# Patient Record
Sex: FEMALE | Race: WHITE | NOT HISPANIC OR LATINO | Employment: OTHER | URBAN - METROPOLITAN AREA
[De-identification: names, ages, dates, MRNs, and addresses within clinical notes are randomized per-mention and may not be internally consistent; named-entity substitution may affect disease eponyms.]

---

## 2017-04-24 ENCOUNTER — TRANSFERRED RECORDS (OUTPATIENT)
Dept: HEALTH INFORMATION MANAGEMENT | Facility: CLINIC | Age: 72
End: 2017-04-24

## 2017-04-27 ENCOUNTER — OFFICE VISIT (OUTPATIENT)
Dept: OBGYN | Facility: CLINIC | Age: 72
End: 2017-04-27
Payer: COMMERCIAL

## 2017-04-27 ENCOUNTER — RADIANT APPOINTMENT (OUTPATIENT)
Dept: ULTRASOUND IMAGING | Facility: CLINIC | Age: 72
End: 2017-04-27
Payer: COMMERCIAL

## 2017-04-27 ENCOUNTER — TELEPHONE (OUTPATIENT)
Dept: OBGYN | Facility: CLINIC | Age: 72
End: 2017-04-27

## 2017-04-27 VITALS
BODY MASS INDEX: 29.88 KG/M2 | SYSTOLIC BLOOD PRESSURE: 140 MMHG | DIASTOLIC BLOOD PRESSURE: 92 MMHG | WEIGHT: 175 LBS | HEIGHT: 64 IN

## 2017-04-27 DIAGNOSIS — N95.0 POSTMENOPAUSAL BLEEDING: ICD-10-CM

## 2017-04-27 DIAGNOSIS — N95.0 POSTMENOPAUSAL BLEEDING: Primary | ICD-10-CM

## 2017-04-27 PROCEDURE — 99203 OFFICE O/P NEW LOW 30 MIN: CPT | Mod: 25 | Performed by: OBSTETRICS & GYNECOLOGY

## 2017-04-27 PROCEDURE — 76830 TRANSVAGINAL US NON-OB: CPT | Performed by: OBSTETRICS & GYNECOLOGY

## 2017-04-27 RX ORDER — ATORVASTATIN CALCIUM 80 MG/1
80 TABLET, FILM COATED ORAL
COMMUNITY
Start: 2017-04-21 | End: 2017-04-28

## 2017-04-27 RX ORDER — TRIAMTERENE AND HYDROCHLOROTHIAZIDE 37.5; 25 MG/1; MG/1
1 CAPSULE ORAL
COMMUNITY
Start: 2017-04-21

## 2017-04-27 RX ORDER — PHENAZOPYRIDINE HYDROCHLORIDE 200 MG/1
200 TABLET, FILM COATED ORAL ONCE
Status: CANCELLED | OUTPATIENT
Start: 2017-04-27 | End: 2017-04-27

## 2017-04-27 RX ORDER — ALLOPURINOL 100 MG/1
100 TABLET ORAL
COMMUNITY
Start: 2017-04-04

## 2017-04-27 ASSESSMENT — PATIENT HEALTH QUESTIONNAIRE - PHQ9: 5. POOR APPETITE OR OVEREATING: SEVERAL DAYS

## 2017-04-27 ASSESSMENT — ANXIETY QUESTIONNAIRES
6. BECOMING EASILY ANNOYED OR IRRITABLE: NOT AT ALL
1. FEELING NERVOUS, ANXIOUS, OR ON EDGE: MORE THAN HALF THE DAYS
3. WORRYING TOO MUCH ABOUT DIFFERENT THINGS: MORE THAN HALF THE DAYS
5. BEING SO RESTLESS THAT IT IS HARD TO SIT STILL: NOT AT ALL
GAD7 TOTAL SCORE: 6
2. NOT BEING ABLE TO STOP OR CONTROL WORRYING: SEVERAL DAYS
7. FEELING AFRAID AS IF SOMETHING AWFUL MIGHT HAPPEN: NOT AT ALL
IF YOU CHECKED OFF ANY PROBLEMS ON THIS QUESTIONNAIRE, HOW DIFFICULT HAVE THESE PROBLEMS MADE IT FOR YOU TO DO YOUR WORK, TAKE CARE OF THINGS AT HOME, OR GET ALONG WITH OTHER PEOPLE: SOMEWHAT DIFFICULT

## 2017-04-27 NOTE — MR AVS SNAPSHOT
After Visit Summary   4/27/2017    Stormy Bowman    MRN: [id number]           Patient Information     Date Of Birth          1945        Visit Information        Provider Department      4/27/2017 12:30 PM Wilman Méndez MD [facility]        Today's Diagnoses     Postmenopausal bleeding    -  1       Follow-ups after your visit        Your next 10 appointments already scheduled     Apr 27, 2017  2:00 PM CDT   US TRANSVAGINAL NON OB with WEUS2   [facility] ([facility])    [address]  [address]  [facility] [address] [phone]           Please bring a list of your medicines (including vitamins, minerals and over-the-counter drugs). Also, tell your doctor about any allergies you may have. Wear comfortable clothes and leave your valuables at home.  You do not need to do anything special to prepare for your exam.  Please call the Imaging Department at your exam site with any questions.            Apr 28, 2017   Procedure with Wilman Méndez MD   [facility] (--)    [address]  [facility] [id number] [phone]            May 01, 2017   Procedure with Wilman Kramer MD   [facility]    [address]  [facility] [id number] [phone]              Who to contact     If you have questions or need follow up information about today's clinic visit or your schedule please contact [facility] directly at [phone].  Normal or non-critical lab and imaging results will be communicated to you by MyChart, letter or phone within 4 business days after the clinic has received the results. If you do not hear from us within 7 days, please contact the clinic through MyChart or phone. If you have a critical or abnormal lab result, we will notify you by phone as soon as possible.  Submit refill requests through POKKT  "or call your pharmacy and they will forward the refill request to us. Please allow 3 business days for your refill to be completed.          Additional Information About Your Visit        MyChart Information     [employer] lets you send messages to your doctor, view your test results, renew your prescriptions, schedule appointments and more. To sign up, go to [email or web address] . Click on \"Log in\" on the left side of the screen, which will take you to the Welcome page. Then click on \"Sign up Now\" on the right side of the page.     You will be asked to enter the access code listed below, as well as some personal information. Please follow the directions to create your username and password.     Your access code is: [email or web address]  Expires: 2017  1:18 PM     Your access code will  in 90 days. If you need help or a new code, please call your [place] clinic or [phone].        Care EveryWhere ID     This is your Care EveryWhere ID. This could be used by other organizations to access your [place] medical records  [id number]        Your Vitals Were     Height Breastfeeding? BMI (Body Mass Index)             5' 3.75\" (1.619 m) No 30.27 kg/m2          Blood Pressure from Last 3 Encounters:   17 (!) 140/92   10/08/15 120/60   12 148/80    Weight from Last 3 Encounters:   17 175 lb (79.4 kg)   10/07/15 170 lb (77.1 kg)   12 170 lb (77.1 kg)               Primary Care Provider Office Phone # Fax #    Oscarjesus Munoz [phone]       [facility] [address]  [facility] [address]        Thank you!     Thank you for choosing [facility]  for your care. Our goal is always to provide you with excellent care. Hearing back from our patients is one way we can continue to improve our services. Please take a few minutes to complete the written survey that you may receive in the mail after your visit with us. Thank you!             Your Updated " Medication List - Protect others around you: Learn how to safely use, store and throw away your medicines at www.disposemymeds.org.          This list is accurate as of: 4/27/17  1:24 PM.  Always use your most recent med list.                   Brand Name Dispense Instructions for use    allopurinol 100 MG tablet    ZYLOPRIM     Take 100 mg by mouth       ASPIRIN PO      Take 162 mg by mouth daily       atorvastatin 80 MG tablet    LIPITOR     Take 80 mg by mouth       BENADRYL DYE-FREE ALLERGY PO      Take 50 mg by mouth nightly as needed.       K-DUR PO      Take 10 mEq by mouth daily       SIMVASTATIN PO      Take 80 mg by mouth.       SYNTHROID PO      Take 75 mcg by mouth.       * triamterene-hydrochlorothiazide 37.5-25 MG per tablet    MAXZIDE-25     Take 1 tablet by mouth daily.       * triamterene-hydrochlorothiazide 37.5-25 MG per capsule    DYAZIDE     Take 1 capsule by mouth       VITAMIN D3 PO      Take 2,000 Units by mouth daily       * Notice:  This list has 2 medication(s) that are the same as other medications prescribed for you. Read the directions carefully, and ask your doctor or other care provider to review them with you.

## 2017-04-27 NOTE — TELEPHONE ENCOUNTER
FROM [facility]    HSC POLYPECTOMY w/VERSAPOINT  DX PMB, Endometrial Polyp    Patient surgery scheduled on 4/28/2017 at 8:20am Check in 6:20am  Location for surgery to performed:   Surgery Outpatient  Scheduled by Sarahy 4/27/2017     Information Packet given :Yes: HANDED 4/27/2017    CPT codes given: No            Consents signed? N/A  Rep Informed :N/A    PREOP DATE :  4/27/2017  In Epic :Yes    On Spreadsheet :Yes    On Calendar EB  :Yes    In [place] Calendar STEVEN  :No    Assist NA   Assist in Epic NA  Assist Notified as needed :No     Holli Fitzgerald  Surgery Scheduler

## 2017-04-27 NOTE — PROGRESS NOTES
SUBJECTIVE:                                                   Stormy Bowman is a 71 year old female who presents to clinic today for the following health issue(s):  Patient presents with:  Menopausal Sx: bleeding started 2 days, bright red, very little. Patient states getting surgery 5/1/17      HPI: The patient is seen at this time for evaluation of a short history of postmenopausal bleeding. She had a similar episode with negative D&C in 2012.      No LMP recorded. Patient is postmenopausal..   Patient is not sexually active, No obstetric history on file..  Using menopause for contraception.    reports that she has never smoked. She does not have any smokeless tobacco history on file.    STD testing offered?  Declined    Health maintenance updated:  yes    Today's PHQ-2 Score: No flowsheet data found.  Today's PHQ-9 Score:   PHQ-9 SCORE 4/27/2017   Total Score 3     Today's HARMONY-7 Score:   HARMONY-7 SCORE 4/27/2017   Total Score 6       Problem list and histories reviewed & adjusted, as indicated.  Additional history: as documented.    Patient Active Problem List   Diagnosis     LOC (loss of consciousness) (H)     Past Surgical History:   Procedure Laterality Date     CHOLECYSTECTOMY       JOINT REPLACEMENT        Social History   Substance Use Topics     Smoking status: Never Smoker     Smokeless tobacco: Not on file     Alcohol use No           Current Outpatient Prescriptions   Medication Sig     atorvastatin (LIPITOR) 80 MG tablet Take 80 mg by mouth     allopurinol (ZYLOPRIM) 100 MG tablet Take 100 mg by mouth     triamterene-hydrochlorothiazide (DYAZIDE) 37.5-25 MG per capsule Take 1 capsule by mouth     Potassium Chloride Elli CR (K-DUR PO) Take 10 mEq by mouth daily     Cholecalciferol (VITAMIN D3 PO) Take 2,000 Units by mouth daily     ASPIRIN PO Take 162 mg by mouth daily     triamterene-hydrochlorothiazide (MAXZIDE-25) 37.5-25 MG per tablet Take 1 tablet by mouth daily.     SIMVASTATIN PO Take 80 mg  "by mouth.     Levothyroxine Sodium (SYNTHROID PO) Take 75 mcg by mouth.     DiphenhydrAMINE HCl (BENADRYL DYE-FREE ALLERGY PO) Take 50 mg by mouth nightly as needed.     No current facility-administered medications for this visit.      Allergies   Allergen Reactions     Erythromycin GI Disturbance     Penicillins        ROS:  12 point review of systems negative other than symptoms noted below.  Genitourinary: Spotting  Musculoskeletal: Joint Pain  Psychiatric: Anxiety and Difficulty Sleeping    OBJECTIVE:     BP (!) 140/92  Ht 5' 3.75\" (1.619 m)  Wt 175 lb (79.4 kg)  Breastfeeding? No  BMI 30.27 kg/m2  Body mass index is 30.27 kg/(m^2).    Exam:  Constitutional:  Appearance: Well nourished, well developed alert, in no acute distress  Neck:  Lymph Nodes:  No lymphadenopathy present; Thyroid:  Gland size normal, nontender, no nodules or masses present on palpation chest is clear to percussion and auscultation, cardiovascular exam within normal limits with normal S1 and S2 normal murmur.  Gastrointestinal:  Abdominal Examination:  Abdomen nontender to palpation, tone normal without rigidity or guarding, no masses present, umbilicus without lesions; Liver/Spleen:  No hepatomegaly present, liver nontender to palpation; Hernias:  No hernias present  Lymphatic: Lymph Nodes:  No other lymphadenopathy present  Skin:General Inspection:  No rashes present, no lesions present, no areas of discoloration; Genitalia and Groin:  No rashes present, no lesions present, no areas of discoloration, no masses present.  Neurologic/Psychiatric:  Mental Status:  Oriented X3   Pelvic Exam:  External Genitalia:     Normal appearance for age, no discharge present, no tenderness present, no inflammatory lesions present, color normal  Vagina:     Normal vaginal vault without central or paravaginal defects, ATROPHIC  Bladder:     Nontender to palpation  Urethra:   Urethral Body:  Urethra palpation normal, urethra structural support " normal   Urethral Meatus:  No erythema or lesions present  Cervix:     Appearance healthy, no lesions present, nontender to palpation, no bleeding present  Uterus:     Nontender to palpation, no masses present, position anteflexed, mobility: normal  Adnexa:     No adnexal tenderness present, no adnexal masses present  Perineum:     Perineum within normal limits, no evidence of trauma, no rashes or skin lesions present  Inguinal Lymph Nodes:     No lymphadenopathy present       In-Clinic Test Results:      ASSESSMENT/PLAN:                                                      71-year-old patient with postmenopausal bleeding and intrauterine filling defect. This is most likely a solitary polyp. We recommend a hysteroscopy with polypectomy and we'll expedite this as she has orthopedic surgery next week.            Wilman Méndez MD  [facility] [place]

## 2017-04-28 ENCOUNTER — ANESTHESIA EVENT (OUTPATIENT)
Dept: SURGERY | Facility: CLINIC | Age: 72
End: 2017-04-28
Payer: COMMERCIAL

## 2017-04-28 ENCOUNTER — ANESTHESIA (OUTPATIENT)
Dept: SURGERY | Facility: CLINIC | Age: 72
End: 2017-04-28
Payer: COMMERCIAL

## 2017-04-28 ENCOUNTER — HOSPITAL ENCOUNTER (OUTPATIENT)
Facility: CLINIC | Age: 72
Discharge: HOME | End: 2017-04-28
Attending: OBSTETRICS & GYNECOLOGY | Admitting: OBSTETRICS & GYNECOLOGY
Payer: COMMERCIAL

## 2017-04-28 VITALS
TEMPERATURE: 95.5 F | DIASTOLIC BLOOD PRESSURE: 96 MMHG | BODY MASS INDEX: 30.88 KG/M2 | SYSTOLIC BLOOD PRESSURE: 123 MMHG | OXYGEN SATURATION: 90 % | WEIGHT: 174.3 LBS | RESPIRATION RATE: 12 BRPM | HEIGHT: 63 IN

## 2017-04-28 DIAGNOSIS — N95.0 POST-MENOPAUSAL BLEEDING: Primary | ICD-10-CM

## 2017-04-28 PROCEDURE — 86923 COMPATIBILITY TEST ELECTRIC: CPT | Performed by: ORTHOPAEDIC SURGERY

## 2017-04-28 PROCEDURE — 36415 COLL VENOUS BLD VENIPUNCTURE: CPT | Performed by: ORTHOPAEDIC SURGERY

## 2017-04-28 PROCEDURE — 37000008 ZZH ANESTHESIA TECHNICAL FEE, 1ST 30 MIN: Performed by: OBSTETRICS & GYNECOLOGY

## 2017-04-28 PROCEDURE — 25000128 H RX IP 250 OP 636: Performed by: NURSE ANESTHETIST, CERTIFIED REGISTERED

## 2017-04-28 PROCEDURE — 88305 TISSUE EXAM BY PATHOLOGIST: CPT | Mod: 26 | Performed by: OBSTETRICS & GYNECOLOGY

## 2017-04-28 PROCEDURE — 25000128 H RX IP 250 OP 636: Performed by: ANESTHESIOLOGY

## 2017-04-28 PROCEDURE — 71000013 ZZH RECOVERY PHASE 1 LEVEL 1 EA ADDTL HR: Performed by: OBSTETRICS & GYNECOLOGY

## 2017-04-28 PROCEDURE — 71000012 ZZH RECOVERY PHASE 1 LEVEL 1 FIRST HR: Performed by: OBSTETRICS & GYNECOLOGY

## 2017-04-28 PROCEDURE — 40000170 ZZH STATISTIC PRE-PROCEDURE ASSESSMENT II: Performed by: OBSTETRICS & GYNECOLOGY

## 2017-04-28 PROCEDURE — 25800025 ZZH RX 258: Performed by: NURSE ANESTHETIST, CERTIFIED REGISTERED

## 2017-04-28 PROCEDURE — 25000125 ZZHC RX 250: Performed by: ANESTHESIOLOGY

## 2017-04-28 PROCEDURE — 58563 HYSTEROSCOPY ABLATION: CPT | Performed by: OBSTETRICS & GYNECOLOGY

## 2017-04-28 PROCEDURE — 36000056 ZZH SURGERY LEVEL 3 1ST 30 MIN: Performed by: OBSTETRICS & GYNECOLOGY

## 2017-04-28 PROCEDURE — 27210995 ZZH RX 272: Performed by: OBSTETRICS & GYNECOLOGY

## 2017-04-28 PROCEDURE — 86900 BLOOD TYPING SEROLOGIC ABO: CPT | Performed by: ORTHOPAEDIC SURGERY

## 2017-04-28 PROCEDURE — 86901 BLOOD TYPING SEROLOGIC RH(D): CPT | Performed by: ORTHOPAEDIC SURGERY

## 2017-04-28 PROCEDURE — 71000027 ZZH RECOVERY PHASE 2 EACH 15 MINS: Performed by: OBSTETRICS & GYNECOLOGY

## 2017-04-28 PROCEDURE — 88305 TISSUE EXAM BY PATHOLOGIST: CPT | Performed by: OBSTETRICS & GYNECOLOGY

## 2017-04-28 PROCEDURE — 27210794 ZZH OR GENERAL SUPPLY STERILE: Performed by: OBSTETRICS & GYNECOLOGY

## 2017-04-28 PROCEDURE — 37000009 ZZH ANESTHESIA TECHNICAL FEE, EACH ADDTL 15 MIN: Performed by: OBSTETRICS & GYNECOLOGY

## 2017-04-28 PROCEDURE — 25800025 ZZH RX 258: Performed by: OBSTETRICS & GYNECOLOGY

## 2017-04-28 PROCEDURE — 86850 RBC ANTIBODY SCREEN: CPT | Performed by: ORTHOPAEDIC SURGERY

## 2017-04-28 PROCEDURE — 36000058 ZZH SURGERY LEVEL 3 EA 15 ADDTL MIN: Performed by: OBSTETRICS & GYNECOLOGY

## 2017-04-28 PROCEDURE — 25000125 ZZHC RX 250: Performed by: NURSE ANESTHETIST, CERTIFIED REGISTERED

## 2017-04-28 RX ORDER — ONDANSETRON 2 MG/ML
4 INJECTION INTRAMUSCULAR; INTRAVENOUS EVERY 30 MIN PRN
Status: DISCONTINUED | OUTPATIENT
Start: 2017-04-28 | End: 2017-04-28 | Stop reason: HOSPADM

## 2017-04-28 RX ORDER — FENTANYL CITRATE 50 UG/ML
25-50 INJECTION, SOLUTION INTRAMUSCULAR; INTRAVENOUS EVERY 5 MIN PRN
Status: DISCONTINUED | OUTPATIENT
Start: 2017-04-28 | End: 2017-04-28 | Stop reason: HOSPADM

## 2017-04-28 RX ORDER — ONDANSETRON 2 MG/ML
INJECTION INTRAMUSCULAR; INTRAVENOUS PRN
Status: DISCONTINUED | OUTPATIENT
Start: 2017-04-28 | End: 2017-04-28

## 2017-04-28 RX ORDER — PROPOFOL 10 MG/ML
INJECTION, EMULSION INTRAVENOUS CONTINUOUS PRN
Status: DISCONTINUED | OUTPATIENT
Start: 2017-04-28 | End: 2017-04-28

## 2017-04-28 RX ORDER — KETOROLAC TROMETHAMINE 15 MG/ML
15 INJECTION, SOLUTION INTRAMUSCULAR; INTRAVENOUS ONCE
Status: COMPLETED | OUTPATIENT
Start: 2017-04-28 | End: 2017-04-28

## 2017-04-28 RX ORDER — ONDANSETRON 4 MG/1
4 TABLET, ORALLY DISINTEGRATING ORAL EVERY 30 MIN PRN
Status: DISCONTINUED | OUTPATIENT
Start: 2017-04-28 | End: 2017-04-28 | Stop reason: HOSPADM

## 2017-04-28 RX ORDER — FENTANYL CITRATE 50 UG/ML
25-50 INJECTION, SOLUTION INTRAMUSCULAR; INTRAVENOUS
Status: CANCELLED | OUTPATIENT
Start: 2017-04-28

## 2017-04-28 RX ORDER — ATORVASTATIN CALCIUM 80 MG/1
80 TABLET, FILM COATED ORAL AT BEDTIME
COMMUNITY
End: 2020-03-02

## 2017-04-28 RX ORDER — MAGNESIUM HYDROXIDE 1200 MG/15ML
LIQUID ORAL PRN
Status: DISCONTINUED | OUTPATIENT
Start: 2017-04-28 | End: 2017-04-28 | Stop reason: HOSPADM

## 2017-04-28 RX ORDER — LIDOCAINE HYDROCHLORIDE 20 MG/ML
INJECTION, SOLUTION INFILTRATION; PERINEURAL PRN
Status: DISCONTINUED | OUTPATIENT
Start: 2017-04-28 | End: 2017-04-28

## 2017-04-28 RX ORDER — FENTANYL CITRATE 50 UG/ML
INJECTION, SOLUTION INTRAMUSCULAR; INTRAVENOUS PRN
Status: DISCONTINUED | OUTPATIENT
Start: 2017-04-28 | End: 2017-04-28

## 2017-04-28 RX ORDER — PHYSOSTIGMINE SALICYLATE 1 MG/ML
1.2 INJECTION INTRAVENOUS
Status: DISCONTINUED | OUTPATIENT
Start: 2017-04-28 | End: 2017-04-28 | Stop reason: HOSPADM

## 2017-04-28 RX ORDER — MEPERIDINE HYDROCHLORIDE 25 MG/ML
12.5 INJECTION INTRAMUSCULAR; INTRAVENOUS; SUBCUTANEOUS
Status: DISCONTINUED | OUTPATIENT
Start: 2017-04-28 | End: 2017-04-28 | Stop reason: HOSPADM

## 2017-04-28 RX ORDER — HYDROCODONE BITARTRATE AND ACETAMINOPHEN 5; 325 MG/1; MG/1
1-2 TABLET ORAL EVERY 4 HOURS PRN
Qty: 10 TABLET | Refills: 0 | Status: ON HOLD | OUTPATIENT
Start: 2017-04-28 | End: 2017-05-01

## 2017-04-28 RX ORDER — SODIUM CHLORIDE, SODIUM LACTATE, POTASSIUM CHLORIDE, CALCIUM CHLORIDE 600; 310; 30; 20 MG/100ML; MG/100ML; MG/100ML; MG/100ML
INJECTION, SOLUTION INTRAVENOUS CONTINUOUS
Status: DISCONTINUED | OUTPATIENT
Start: 2017-04-28 | End: 2017-04-28 | Stop reason: HOSPADM

## 2017-04-28 RX ORDER — NALOXONE HYDROCHLORIDE 0.4 MG/ML
.1-.4 INJECTION, SOLUTION INTRAMUSCULAR; INTRAVENOUS; SUBCUTANEOUS
Status: DISCONTINUED | OUTPATIENT
Start: 2017-04-28 | End: 2017-04-28 | Stop reason: HOSPADM

## 2017-04-28 RX ORDER — HYDROMORPHONE HYDROCHLORIDE 1 MG/ML
.3-.5 INJECTION, SOLUTION INTRAMUSCULAR; INTRAVENOUS; SUBCUTANEOUS EVERY 10 MIN PRN
Status: CANCELLED | OUTPATIENT
Start: 2017-04-28

## 2017-04-28 RX ORDER — SODIUM CHLORIDE, SODIUM LACTATE, POTASSIUM CHLORIDE, CALCIUM CHLORIDE 600; 310; 30; 20 MG/100ML; MG/100ML; MG/100ML; MG/100ML
INJECTION, SOLUTION INTRAVENOUS CONTINUOUS PRN
Status: DISCONTINUED | OUTPATIENT
Start: 2017-04-28 | End: 2017-04-28

## 2017-04-28 RX ORDER — PROPOFOL 10 MG/ML
INJECTION, EMULSION INTRAVENOUS PRN
Status: DISCONTINUED | OUTPATIENT
Start: 2017-04-28 | End: 2017-04-28

## 2017-04-28 RX ORDER — DEXAMETHASONE SODIUM PHOSPHATE 4 MG/ML
INJECTION, SOLUTION INTRA-ARTICULAR; INTRALESIONAL; INTRAMUSCULAR; INTRAVENOUS; SOFT TISSUE PRN
Status: DISCONTINUED | OUTPATIENT
Start: 2017-04-28 | End: 2017-04-28

## 2017-04-28 RX ADMIN — FENTANYL CITRATE 50 MCG: 50 INJECTION INTRAMUSCULAR; INTRAVENOUS at 09:37

## 2017-04-28 RX ADMIN — ONDANSETRON 4 MG: 2 INJECTION INTRAMUSCULAR; INTRAVENOUS at 09:00

## 2017-04-28 RX ADMIN — PROPOFOL 160 MG: 10 INJECTION, EMULSION INTRAVENOUS at 08:57

## 2017-04-28 RX ADMIN — MIDAZOLAM HYDROCHLORIDE 1 MG: 1 INJECTION, SOLUTION INTRAMUSCULAR; INTRAVENOUS at 08:57

## 2017-04-28 RX ADMIN — DEXAMETHASONE SODIUM PHOSPHATE 4 MG: 4 INJECTION, SOLUTION INTRA-ARTICULAR; INTRALESIONAL; INTRAMUSCULAR; INTRAVENOUS; SOFT TISSUE at 09:00

## 2017-04-28 RX ADMIN — PROPOFOL 150 MCG/KG/MIN: 10 INJECTION, EMULSION INTRAVENOUS at 08:57

## 2017-04-28 RX ADMIN — FENTANYL CITRATE 50 MCG: 50 INJECTION INTRAMUSCULAR; INTRAVENOUS at 09:58

## 2017-04-28 RX ADMIN — FENTANYL CITRATE 50 MCG: 50 INJECTION INTRAMUSCULAR; INTRAVENOUS at 09:49

## 2017-04-28 RX ADMIN — SODIUM CHLORIDE, POTASSIUM CHLORIDE, SODIUM LACTATE AND CALCIUM CHLORIDE: 600; 310; 30; 20 INJECTION, SOLUTION INTRAVENOUS at 08:53

## 2017-04-28 RX ADMIN — KETOROLAC TROMETHAMINE 15 MG: 15 INJECTION, SOLUTION INTRAMUSCULAR; INTRAVENOUS at 10:01

## 2017-04-28 RX ADMIN — FENTANYL CITRATE 50 MCG: 50 INJECTION, SOLUTION INTRAMUSCULAR; INTRAVENOUS at 08:57

## 2017-04-28 RX ADMIN — LIDOCAINE HYDROCHLORIDE 60 MG: 20 INJECTION, SOLUTION INFILTRATION; PERINEURAL at 08:57

## 2017-04-28 ASSESSMENT — ANXIETY QUESTIONNAIRES: GAD7 TOTAL SCORE: 6

## 2017-04-28 ASSESSMENT — PATIENT HEALTH QUESTIONNAIRE - PHQ9: SUM OF ALL RESPONSES TO PHQ QUESTIONS 1-9: 3

## 2017-04-28 NOTE — ANESTHESIA PREPROCEDURE EVALUATION
Anesthesia Evaluation     . Pt has had prior anesthetic.     No history of anesthetic complications          ROS/MED HX    ENT/Pulmonary:      (-) cystic fibrosis   Neurologic:       Cardiovascular:     (+) Dyslipidemia, hypertension----. : . . . :. .       METS/Exercise Tolerance:     Hematologic:         Musculoskeletal:   (+) arthritis (gout), , , -       GI/Hepatic:         Renal/Genitourinary:         Endo:         Psychiatric:         Infectious Disease:         Malignancy:         Other: Comment: Post menopausal bleeding                    Physical Exam  Normal systems: cardiovascular and pulmonary    Airway   Mallampati: II  TM distance: >3 FB  Neck ROM: limited    Dental     Cardiovascular       Pulmonary                     Anesthesia Plan      History & Physical Review  History and physical reviewed and following examination; no interval change.    ASA Status:  2 .    NPO Status:  > 8 hours    Plan for General and LMA with Intravenous and Propofol induction. Maintenance will be TIVA.    PONV prophylaxis:  Ondansetron (or other 5HT-3) and Dexamethasone or Solumedrol       Postoperative Care  Postoperative pain management:  IV analgesics and Oral pain medications.      Consents  Anesthetic plan, risks, benefits and alternatives discussed with:  Patient..                          .  DPreop diagnosis: POST MENOPAUSAL BLEEDING, ENDOMETRIAL POLYPS  Procedure(s):  COMBINED HYSTEROSCOPY, ABLATE ENDOMETRIUM VERSAPOINT  Allergies   Allergen Reactions     Erythromycin GI Disturbance     Penicillins Unknown     Dilaudid [Hydromorphone] Nausea and Vomiting     Can take Norco and Percocet       No current facility-administered medications on file prior to encounter.   Current Outpatient Prescriptions on File Prior to Encounter:  ATORVASTATIN CALCIUM PO Take 80 mg by mouth daily   Probiotic Product (ALIGN PO) Take 4 mg by mouth daily   Psyllium (WAL-MUCIL PO) Take 1 capsule by mouth 2 times daily   allopurinol  (ZYLOPRIM) 100 MG tablet Take 100 mg by mouth daily    triamterene-hydrochlorothiazide (DYAZIDE) 37.5-25 MG per capsule Take 1 capsule by mouth daily    Potassium Chloride Elli CR (K-DUR PO) Take 10 mEq by mouth daily   Cholecalciferol (VITAMIN D3 PO) Take 2,000 Units by mouth every evening    Levothyroxine Sodium (SYNTHROID PO) Take 75 mcg by mouth daily    DiphenhydrAMINE HCl (BENADRYL DYE-FREE ALLERGY PO) Take 50 mg by mouth every evening (Takes x 25mg tablet = 50mg dose)   ASPIRIN PO Take 162 mg by mouth every evening (Takes 2 x 81mg tablet = 162mg)

## 2017-04-28 NOTE — IP AVS SNAPSHOT
MRN:[id number]                      After Visit Summary   4/28/2017    Stormy Bowman    MRN: [id number]           Thank you!     Thank you for choosing [facility] for your care. Our goal is always to provide you with excellent care. Hearing back from our patients is one way we can continue to improve our services. Please take a few minutes to complete the written survey that you may receive in the mail after you visit with us. Thank you!        Patient Information     Date Of Birth          1945        About your hospital stay     You were admitted on:  April 28, 2017 You last received care in the:  [facility]    You were discharged on:  April 28, 2017       Who to Call     For medical emergencies, please call 911.  For non-urgent questions about your medical care, please call your primary care provider or clinic, [phone]  For questions related to your surgery, please call your surgery clinic        Attending Provider     Provider Wilman Rayo MD OB/Gyn       Primary Care Provider Office Phone # Fax #    Oscar Ivana Munoz [phone]       [facility] [address]  [facility] [address]        After Care Instructions     Discharge Instructions       Patient to arrange follow up appointment by phone in 2 weeks                  Your next 10 appointments already scheduled     May 01, 2017   Procedure with Wilman Kramer MD   [facility] (--)    [address]  [facility] [address] [phone]              Further instructions from your care team           Same Day Surgery Discharge Instructions for  Sedation and General Anesthesia       It's not unusual to feel dizzy, light-headed or faint for up to 24 hours after surgery or while taking pain medication.  If you have these symptoms: sit for a few minutes before standing and have someone assist you when you get up to walk or use the  bathroom.      You should rest and relax for the next 24 hours. We recommend you make arrangements to have an adult stay with you for at least 24 hours after your discharge.  Avoid hazardous and strenuous activity.      DO NOT DRIVE any vehicle or operate mechanical equipment for 24 hours following the end of your surgery.  Even though you may feel normal, your reactions may be affected by the medication you have received.      Do not drink alcoholic beverages for 24 hours following surgery.       Slowly progress to your regular diet as you feel able. It's not unusual to feel nauseated and/or vomit after receiving anesthesia.  If you develop these symptoms, drink clear liquids (apple juice, ginger ale, broth, 7-up, etc. ) until you feel better.  If your nausea and vomiting persists for 24 hours, please notify your surgeon.        All narcotic pain medications, along with inactivity and anesthesia, can cause constipation. Drinking plenty of liquids and increasing fiber intake will help.      For any questions of a medical nature, call your surgeon.      Do not make important decisions for 24 hours.      If you had general anesthesia, you may have a sore throat for a couple of days related to the breathing tube used during surgery.  You may use Cepacol lozenges to help with this discomfort.  If it worsens or if you develop a fever, contact your surgeon.       If you feel your pain is not well managed with the pain medications prescribed by your surgeon, please contact your surgeon's office to let them know so they can address your concerns.         Post-Operative Instructions Following Endometrial Ablation    After endometrial ablation, some women experience some cramping, mild pain, nausea and/or vomiting.  Most women feel back to themselves and can resume normal activities within a day or two.      DRAINAGE:  Watery and/or bloody discharge following endometrial ablation is normal.  Reddish, yellow or brown discharge  "is normal.  Light spotting may last up to a few weeks.  Discharge may increase with certain activities.      Do not use tampons, douches or resume intercourse until bleeding has stopped and as directed by your surgeon.    Reasons to call your Surgeon:    1.  A fever higher than 100.4   2.  Worsening pelvic pain that is not relieved prescribed pain medication  3.  Nausea, vomiting or shortness of breath  4.  A greenish vaginal discharge.      **If you have questions or concerns about your procedure,   call Dr. Méndez at [phone]**      While you were at the hospital today you received Toradol, an antiinflammatory medication similar to Ibuprofen.  You should not take other antiinflammatory medication, such as Ibuprofen, Motrin, Advil, Aleve, Naprosyn, etc, until 4:00 pm.     Pending Results     Date and Time Order Name Status Description    2017 0912 Surgical pathology exam In process             Admission Information     Date & Time Provider Department Dept. Phone    2017 Wilman Méndez MD [facility] Same Day Surgery [phone]      Your Vitals Were     Blood Pressure Temperature Respirations Height Weight Pulse Oximetry    123/96 95.5  F (35.3  C) 12 1.588 m (5' 2.5\") 79.1 kg (174 lb 4.8 oz) 90%    BMI (Body Mass Index)                   31.37 kg/m2           [employer]     [employer] lets you send messages to your doctor, view your test results, renew your prescriptions, schedule appointments and more. To sign up, go to [email or web address] . Click on \"Log in\" on the left side of the screen, which will take you to the Welcome page. Then click on \"Sign up Now\" on the right side of the page.     You will be asked to enter the access code listed below, as well as some personal information. Please follow the directions to create your username and password.     Your access code is: [email or web address]  Expires: 2017  1:18 PM     Your access code will  in 90 days. If you need help " or a new code, please call your [facility] or [phone].        Care EveryWhere ID     This is your Care EveryWhere ID. This could be used by other organizations to access your [place] medical records  [id number]           Review of your medicines      START taking        Dose / Directions    HYDROcodone-acetaminophen 5-325 MG per tablet   Commonly known as:  NORCO   Used for:  Post-menopausal bleeding        Dose:  1-2 tablet   Take 1-2 tablets by mouth every 4 hours as needed for other (Moderate to Severe Pain)   Quantity:  10 tablet   Refills:  0         CONTINUE these medicines which have NOT CHANGED        Dose / Directions    ALIGN PO        Dose:  4 mg   Take 4 mg by mouth daily   Refills:  0       allopurinol 100 MG tablet   Commonly known as:  ZYLOPRIM        Dose:  100 mg   Take 100 mg by mouth daily   Refills:  0       ASPIRIN PO   Notes to Patient:  Do Not resume until after your Hip surgery.        Dose:  162 mg   Take 162 mg by mouth every evening (Takes 2 x 81mg tablet = 162mg)   Refills:  0       ATORVASTATIN CALCIUM PO        Dose:  80 mg   Take 80 mg by mouth daily   Refills:  0       BENADRYL DYE-FREE ALLERGY PO        Dose:  50 mg   Take 50 mg by mouth every evening (Takes x 25mg tablet = 50mg dose)   Refills:  0       K-DUR PO        Dose:  10 mEq   Take 10 mEq by mouth daily   Refills:  0       SYNTHROID PO        Dose:  75 mcg   Take 75 mcg by mouth daily   Refills:  0       triamterene-hydrochlorothiazide 37.5-25 MG per capsule   Commonly known as:  DYAZIDE        Dose:  1 capsule   Take 1 capsule by mouth daily   Refills:  0       VITAMIN D3 PO        Dose:  2000 Units   Take 2,000 Units by mouth every evening   Refills:  0       WAL-MUCIL PO        Dose:  1 capsule   Take 1 capsule by mouth 2 times daily   Refills:  0            Where to get your medicines      Some of these will need a paper prescription and others can be bought over the counter. Ask your nurse if you have  questions.     Bring a paper prescription for each of these medications     HYDROcodone-acetaminophen 5-325 MG per tablet                Protect others around you: Learn how to safely use, store and throw away your medicines at www.disposemymeds.org.             Medication List: This is a list of all your medications and when to take them. Check marks below indicate your daily home schedule. Keep this list as a reference.      Medications           Morning Afternoon Evening Bedtime As Needed    ALIGN PO   Take 4 mg by mouth daily                                allopurinol 100 MG tablet   Commonly known as:  ZYLOPRIM   Take 100 mg by mouth daily                                ASPIRIN PO   Take 162 mg by mouth every evening (Takes 2 x 81mg tablet = 162mg)   Notes to Patient:  Do Not resume until after your Hip surgery.                                ATORVASTATIN CALCIUM PO   Take 80 mg by mouth daily                                BENADRYL DYE-FREE ALLERGY PO   Take 50 mg by mouth every evening (Takes x 25mg tablet = 50mg dose)                                HYDROcodone-acetaminophen 5-325 MG per tablet   Commonly known as:  NORCO   Take 1-2 tablets by mouth every 4 hours as needed for other (Moderate to Severe Pain)                                K-DUR PO   Take 10 mEq by mouth daily                                SYNTHROID PO   Take 75 mcg by mouth daily                                triamterene-hydrochlorothiazide 37.5-25 MG per capsule   Commonly known as:  DYAZIDE   Take 1 capsule by mouth daily                                VITAMIN D3 PO   Take 2,000 Units by mouth every evening                                WAL-MUCIL PO   Take 1 capsule by mouth 2 times daily

## 2017-04-28 NOTE — OP NOTE
DATE OF SURGERY:  2017.      REASON FOR ADMISSION:  Postmenopausal bleeding.      OPERATIVE PROCEDURES:  Examination under anesthesia, cervical dilatation for complete cervical stenosis, hysteroscopy, removal of endometrial polyp, endometrial ablation with Versapoint.      DESCRIPTION OF PROCEDURE:  After general anesthesia, Stormy Bowman was placed in the dorsal lithotomy position and prepped and draped in the usual fashion.  The cervix was grasped and delivered to the introitus.  There was complete cervical stenosis and a lacrimal duct probe was used to carefully dilate past the scarring.  Progressive dilators were used to be able to place the hysteroscope.  The hysteroscope was placed and we could find a large solitary polyp emanating from the left cornual portion of the endometrial cavity.  The grasping forceps was used to cross the base and this was removed and sent to the pathologist intact.  The Versapoint was brought in and an ablation of the entire endometrial cavity from the ostia all the way to the lower uterine segment was accomplished to try to prevent any further hyperplasia, hypertrophy or polyps.  The patient tolerated this very well.  Blood loss for the case was 1 mL.  Fluid deficit was 0.      PLAN:  We will await pathology for final disposition.         NINOSKA VALENZUELA JR, MD             D: 2017 09:29   T: 2017 13:09   MT: TS      Name:     STORMY BOWMAN   MRN:      [id number]        Account:        [id number]   :      1945           Procedure Date: 2017      Document: [id number]       cc: [facility]        MD Oscar Escobar Jr, MD

## 2017-04-28 NOTE — BRIEF OP NOTE
[facility] Brief Operative Note    Pre-operative diagnosis: POST MENOPAUSAL BLEEDING, ENDOMETRIAL POLYP   Post-operative diagnosis COMPLETE CERVICAL STENOSIS   Procedure: Procedure(s):CERVICAL DILATATION FOR COMPLETE CERVICAL STENOSIS  HYSTEROSCOPY, POLYPECTOMY, ENDOMETRIAL ABLATION WITH VERSAPOINT - Wound Class: II-Clean Contaminated   Surgeon(s): Surgeon(s) and Role:      Wilman Méndez MD - Primary   Estimated blood loss: 1 CC    Specimens:   ID Type Source Tests Collected by Time Destination   A : endometrial polyp Polyp Endometrium SURGICAL PATHOLOGY EXAM Wilman Méndez MD 4/28/2017  9:11 AM       Findings: LARGE SOLITARY POLYP

## 2017-04-28 NOTE — ANESTHESIA CARE TRANSFER NOTE
Patient: Stormy Bowman    Procedure(s):  HYSTEROSCOPY, POLYPECTOMY WITH VERSAPOINT - Wound Class: II-Clean Contaminated    Diagnosis: POST MENOPAUSAL BLEEDING, ENDOMETRIAL POLYPS  Diagnosis Additional Information: No value filed.    Anesthesia Type:   General, LMA     Note:  Airway :Face Mask  Patient transferred to:Phase II  Comments: Spontaneous resp observed. Monitors and alarms on. VSS. Report given.      Vitals: (Last set prior to Anesthesia Care Transfer)    CRNA VITALS  4/28/2017 0853 - 4/28/2017 0928      4/28/2017             NIBP: (!)  137/91    NIBP Mean: 120                Electronically Signed By: DOLORES Villalta CRNA  April 28, 2017  9:28 AM

## 2017-04-28 NOTE — DISCHARGE INSTRUCTIONS
Same Day Surgery Discharge Instructions for  Sedation and General Anesthesia       It's not unusual to feel dizzy, light-headed or faint for up to 24 hours after surgery or while taking pain medication.  If you have these symptoms: sit for a few minutes before standing and have someone assist you when you get up to walk or use the bathroom.      You should rest and relax for the next 24 hours. We recommend you make arrangements to have an adult stay with you for at least 24 hours after your discharge.  Avoid hazardous and strenuous activity.      DO NOT DRIVE any vehicle or operate mechanical equipment for 24 hours following the end of your surgery.  Even though you may feel normal, your reactions may be affected by the medication you have received.      Do not drink alcoholic beverages for 24 hours following surgery.       Slowly progress to your regular diet as you feel able. It's not unusual to feel nauseated and/or vomit after receiving anesthesia.  If you develop these symptoms, drink clear liquids (apple juice, ginger ale, broth, 7-up, etc. ) until you feel better.  If your nausea and vomiting persists for 24 hours, please notify your surgeon.        All narcotic pain medications, along with inactivity and anesthesia, can cause constipation. Drinking plenty of liquids and increasing fiber intake will help.      For any questions of a medical nature, call your surgeon.      Do not make important decisions for 24 hours.      If you had general anesthesia, you may have a sore throat for a couple of days related to the breathing tube used during surgery.  You may use Cepacol lozenges to help with this discomfort.  If it worsens or if you develop a fever, contact your surgeon.       If you feel your pain is not well managed with the pain medications prescribed by your surgeon, please contact your surgeon's office to let them know so they can address your concerns.         Post-Operative Instructions Following  Endometrial Ablation    After endometrial ablation, some women experience some cramping, mild pain, nausea and/or vomiting.  Most women feel back to themselves and can resume normal activities within a day or two.      DRAINAGE:  Watery and/or bloody discharge following endometrial ablation is normal.  Reddish, yellow or brown discharge is normal.  Light spotting may last up to a few weeks.  Discharge may increase with certain activities.      Do not use tampons, douches or resume intercourse until bleeding has stopped and as directed by your surgeon.    Reasons to call your Surgeon:    1.  A fever higher than 100.4   2.  Worsening pelvic pain that is not relieved prescribed pain medication  3.  Nausea, vomiting or shortness of breath  4.  A greenish vaginal discharge.      **If you have questions or concerns about your procedure,   call Dr. Méndez at [phone]**      While you were at the hospital today you received Toradol, an antiinflammatory medication similar to Ibuprofen.  You should not take other antiinflammatory medication, such as Ibuprofen, Motrin, Advil, Aleve, Naprosyn, etc, until 4:00 pm.

## 2017-04-28 NOTE — ANESTHESIA POSTPROCEDURE EVALUATION
Patient: Stormy Bowman    Procedure(s):  CERVICAL DILITATION FOR COMPLETE CERVICAL STENOSIS, HYSTEROSCOPY, POLYPECTOMY, ENDOMETRIAL ABLATION WITH VERSAPOINT  - Wound Class: II-Clean Contaminated    Diagnosis:POST MENOPAUSAL BLEEDING, ENDOMETRIAL POLYPS  Diagnosis Additional Information: No value filed.    Anesthesia Type:  General, LMA    Note:  Anesthesia Post Evaluation    Patient location during evaluation: PACU  Patient participation: Able to fully participate in evaluation  Level of consciousness: awake  Pain management: adequate  Airway patency: patent  Cardiovascular status: acceptable  Respiratory status: acceptable  Hydration status: acceptable  PONV: none     Anesthetic complications: None          Last vitals:  Vitals:    04/28/17 1000 04/28/17 1015 04/28/17 1030   BP: 128/85 127/82 (!) 123/96   Resp: 12 16 12   Temp: 35.3  C (95.5  F)     SpO2: 99% 98% 90%         Electronically Signed By: Hosea Higginbotham MD  April 28, 2017  10:50 AM

## 2017-04-28 NOTE — IP AVS SNAPSHOT
[facility] Same Day Surgery    [address]    [place] [address]    Phone:  [phone]    Fax:  [phone]                                       After Visit Summary   4/28/2017    Stormy Bowman    MRN: [id number]           After Visit Summary Signature Page     I have received my discharge instructions, and my questions have been answered. I have discussed any challenges I see with this plan with the nurse or doctor.    ..........................................................................................................................................  Patient/Patient Representative Signature      ..........................................................................................................................................  Patient Representative Print Name and Relationship to Patient    ..................................................               ................................................  Date                                            Time    ..........................................................................................................................................  Reviewed by Signature/Title    ...................................................              ..............................................  Date                                                            Time

## 2017-04-30 LAB
ABO + RH BLD: NORMAL
ABO + RH BLD: NORMAL
BLD GP AB SCN SERPL QL: NORMAL
BLD PROD TYP BPU: NORMAL
BLOOD BANK CMNT PATIENT-IMP: NORMAL
NUM BPU REQUESTED: 4
SPECIMEN EXP DATE BLD: NORMAL

## 2017-05-01 ENCOUNTER — HOSPITAL ENCOUNTER (INPATIENT)
Facility: CLINIC | Age: 72
LOS: 3 days | Discharge: NURSING, REHAB OR LONG-TERM CARE | DRG: 467 | End: 2017-05-04
Attending: ORTHOPAEDIC SURGERY | Admitting: ORTHOPAEDIC SURGERY
Payer: MEDICARE

## 2017-05-01 ENCOUNTER — APPOINTMENT (OUTPATIENT)
Dept: PHYSICAL THERAPY | Facility: CLINIC | Age: 72
DRG: 467 | End: 2017-05-01
Attending: ORTHOPAEDIC SURGERY
Payer: MEDICARE

## 2017-05-01 ENCOUNTER — SURGERY (OUTPATIENT)
Age: 72
End: 2017-05-01

## 2017-05-01 ENCOUNTER — ANESTHESIA (OUTPATIENT)
Dept: SURGERY | Facility: CLINIC | Age: 72
DRG: 467 | End: 2017-05-01
Payer: MEDICARE

## 2017-05-01 ENCOUNTER — APPOINTMENT (OUTPATIENT)
Dept: GENERAL RADIOLOGY | Facility: CLINIC | Age: 72
DRG: 467 | End: 2017-05-01
Attending: ORTHOPAEDIC SURGERY
Payer: MEDICARE

## 2017-05-01 ENCOUNTER — ANESTHESIA EVENT (OUTPATIENT)
Dept: SURGERY | Facility: CLINIC | Age: 72
DRG: 467 | End: 2017-05-01
Payer: MEDICARE

## 2017-05-01 DIAGNOSIS — Z96.649 HIP JOINT REPLACEMENT STATUS: Primary | ICD-10-CM

## 2017-05-01 LAB
ABO + RH BLD: NORMAL
ABO + RH BLD: NORMAL
BLD GP AB SCN SERPL QL: NORMAL
BLOOD BANK CMNT PATIENT-IMP: NORMAL
COPATH REPORT: NORMAL
CREAT SERPL-MCNC: 1.05 MG/DL (ref 0.52–1.04)
GFR SERPL CREATININE-BSD FRML MDRD: 51 ML/MIN/1.7M2
PLATELET # BLD AUTO: 180 10E9/L (ref 150–450)
POTASSIUM SERPL-SCNC: 3.7 MMOL/L (ref 3.4–5.3)
SPECIMEN EXP DATE BLD: NORMAL

## 2017-05-01 PROCEDURE — 86900 BLOOD TYPING SEROLOGIC ABO: CPT | Performed by: ANESTHESIOLOGY

## 2017-05-01 PROCEDURE — 25000132 ZZH RX MED GY IP 250 OP 250 PS 637: Mod: GY | Performed by: ORTHOPAEDIC SURGERY

## 2017-05-01 PROCEDURE — 86901 BLOOD TYPING SEROLOGIC RH(D): CPT | Performed by: ANESTHESIOLOGY

## 2017-05-01 PROCEDURE — 0SUA09Z SUPPLEMENT RIGHT HIP JOINT, ACETABULAR SURFACE WITH LINER, OPEN APPROACH: ICD-10-PCS | Performed by: ORTHOPAEDIC SURGERY

## 2017-05-01 PROCEDURE — 37000009 ZZH ANESTHESIA TECHNICAL FEE, EACH ADDTL 15 MIN: Performed by: ORTHOPAEDIC SURGERY

## 2017-05-01 PROCEDURE — 97530 THERAPEUTIC ACTIVITIES: CPT | Mod: GP

## 2017-05-01 PROCEDURE — 25800025 ZZH RX 258: Performed by: ORTHOPAEDIC SURGERY

## 2017-05-01 PROCEDURE — 25000128 H RX IP 250 OP 636: Performed by: NURSE ANESTHETIST, CERTIFIED REGISTERED

## 2017-05-01 PROCEDURE — 71000012 ZZH RECOVERY PHASE 1 LEVEL 1 FIRST HR: Performed by: ORTHOPAEDIC SURGERY

## 2017-05-01 PROCEDURE — 0SPR0JZ REMOVAL OF SYNTHETIC SUBSTITUTE FROM RIGHT HIP JOINT, FEMORAL SURFACE, OPEN APPROACH: ICD-10-PCS | Performed by: ORTHOPAEDIC SURGERY

## 2017-05-01 PROCEDURE — 0SRR01A REPLACEMENT OF RIGHT HIP JOINT, FEMORAL SURFACE WITH METAL SYNTHETIC SUBSTITUTE, UNCEMENTED, OPEN APPROACH: ICD-10-PCS | Performed by: ORTHOPAEDIC SURGERY

## 2017-05-01 PROCEDURE — 97110 THERAPEUTIC EXERCISES: CPT | Mod: GP

## 2017-05-01 PROCEDURE — 86850 RBC ANTIBODY SCREEN: CPT | Performed by: ANESTHESIOLOGY

## 2017-05-01 PROCEDURE — 71000013 ZZH RECOVERY PHASE 1 LEVEL 1 EA ADDTL HR: Performed by: ORTHOPAEDIC SURGERY

## 2017-05-01 PROCEDURE — 40000986 XR PELVIS AD HIP PORTABLE RIGHT 1 VIEW

## 2017-05-01 PROCEDURE — 85049 AUTOMATED PLATELET COUNT: CPT | Performed by: ORTHOPAEDIC SURGERY

## 2017-05-01 PROCEDURE — 84132 ASSAY OF SERUM POTASSIUM: CPT | Performed by: ANESTHESIOLOGY

## 2017-05-01 PROCEDURE — 97162 PT EVAL MOD COMPLEX 30 MIN: CPT | Mod: GP

## 2017-05-01 PROCEDURE — 25800025 ZZH RX 258: Performed by: ANESTHESIOLOGY

## 2017-05-01 PROCEDURE — 0SP909Z REMOVAL OF LINER FROM RIGHT HIP JOINT, OPEN APPROACH: ICD-10-PCS | Performed by: ORTHOPAEDIC SURGERY

## 2017-05-01 PROCEDURE — 36000067 ZZH SURGERY LEVEL 5 1ST 30 MIN: Performed by: ORTHOPAEDIC SURGERY

## 2017-05-01 PROCEDURE — 27210794 ZZH OR GENERAL SUPPLY STERILE: Performed by: ORTHOPAEDIC SURGERY

## 2017-05-01 PROCEDURE — A9270 NON-COVERED ITEM OR SERVICE: HCPCS | Mod: GY | Performed by: ORTHOPAEDIC SURGERY

## 2017-05-01 PROCEDURE — 25000125 ZZHC RX 250: Performed by: ORTHOPAEDIC SURGERY

## 2017-05-01 PROCEDURE — 82565 ASSAY OF CREATININE: CPT | Performed by: ORTHOPAEDIC SURGERY

## 2017-05-01 PROCEDURE — 40000170 ZZH STATISTIC PRE-PROCEDURE ASSESSMENT II: Performed by: ORTHOPAEDIC SURGERY

## 2017-05-01 PROCEDURE — 25000125 ZZHC RX 250: Performed by: NURSE ANESTHETIST, CERTIFIED REGISTERED

## 2017-05-01 PROCEDURE — C1776 JOINT DEVICE (IMPLANTABLE): HCPCS | Performed by: ORTHOPAEDIC SURGERY

## 2017-05-01 PROCEDURE — 25000128 H RX IP 250 OP 636: Performed by: ORTHOPAEDIC SURGERY

## 2017-05-01 PROCEDURE — 40000193 ZZH STATISTIC PT WARD VISIT

## 2017-05-01 PROCEDURE — S0077 INJECTION, CLINDAMYCIN PHOSP: HCPCS | Performed by: ORTHOPAEDIC SURGERY

## 2017-05-01 PROCEDURE — C1713 ANCHOR/SCREW BN/BN,TIS/BN: HCPCS | Performed by: ORTHOPAEDIC SURGERY

## 2017-05-01 PROCEDURE — 27210995 ZZH RX 272: Performed by: ORTHOPAEDIC SURGERY

## 2017-05-01 PROCEDURE — 27810169 ZZH OR IMPLANT GENERAL: Performed by: ORTHOPAEDIC SURGERY

## 2017-05-01 PROCEDURE — 12000007 ZZH R&B INTERMEDIATE

## 2017-05-01 PROCEDURE — 36415 COLL VENOUS BLD VENIPUNCTURE: CPT | Performed by: ORTHOPAEDIC SURGERY

## 2017-05-01 PROCEDURE — 36415 COLL VENOUS BLD VENIPUNCTURE: CPT | Performed by: ANESTHESIOLOGY

## 2017-05-01 PROCEDURE — 36000069 ZZH SURGERY LEVEL 5 EA 15 ADDTL MIN: Performed by: ORTHOPAEDIC SURGERY

## 2017-05-01 PROCEDURE — 37000008 ZZH ANESTHESIA TECHNICAL FEE, 1ST 30 MIN: Performed by: ORTHOPAEDIC SURGERY

## 2017-05-01 DEVICE — IMP HEAD MOD HIP BIOM STD 32MM NK 163669: Type: IMPLANTABLE DEVICE | Site: HIP | Status: FUNCTIONAL

## 2017-05-01 DEVICE — IMP SLEEVE CABLE GRIP BIOM ORHTO 2MM 120005: Type: IMPLANTABLE DEVICE | Site: HIP | Status: FUNCTIONAL

## 2017-05-01 DEVICE — IMP CABLE BIOM ORTHO 2MMX750MM 120002: Type: IMPLANTABLE DEVICE | Site: HIP | Status: FUNCTIONAL

## 2017-05-01 RX ORDER — POTASSIUM CHLORIDE 750 MG/1
10 TABLET, EXTENDED RELEASE ORAL
Status: DISCONTINUED | OUTPATIENT
Start: 2017-05-01 | End: 2017-05-04 | Stop reason: HOSPADM

## 2017-05-01 RX ORDER — FENTANYL CITRATE 50 UG/ML
INJECTION, SOLUTION INTRAMUSCULAR; INTRAVENOUS PRN
Status: DISCONTINUED | OUTPATIENT
Start: 2017-05-01 | End: 2017-05-01

## 2017-05-01 RX ORDER — PROPOFOL 10 MG/ML
INJECTION, EMULSION INTRAVENOUS PRN
Status: DISCONTINUED | OUTPATIENT
Start: 2017-05-01 | End: 2017-05-01

## 2017-05-01 RX ORDER — EPHEDRINE SULFATE 50 MG/ML
INJECTION, SOLUTION INTRAMUSCULAR; INTRAVENOUS; SUBCUTANEOUS PRN
Status: DISCONTINUED | OUTPATIENT
Start: 2017-05-01 | End: 2017-05-01

## 2017-05-01 RX ORDER — ONDANSETRON 2 MG/ML
4 INJECTION INTRAMUSCULAR; INTRAVENOUS EVERY 30 MIN PRN
Status: DISCONTINUED | OUTPATIENT
Start: 2017-05-01 | End: 2017-05-01 | Stop reason: HOSPADM

## 2017-05-01 RX ORDER — ATORVASTATIN CALCIUM 80 MG/1
80 TABLET, FILM COATED ORAL
Status: DISCONTINUED | OUTPATIENT
Start: 2017-05-01 | End: 2017-05-04 | Stop reason: HOSPADM

## 2017-05-01 RX ORDER — ACETAMINOPHEN 325 MG/1
975 TABLET ORAL EVERY 8 HOURS
Status: COMPLETED | OUTPATIENT
Start: 2017-05-01 | End: 2017-05-04

## 2017-05-01 RX ORDER — ASPIRIN 81 MG/1
162 TABLET, CHEWABLE ORAL
Status: DISCONTINUED | OUTPATIENT
Start: 2017-05-01 | End: 2017-05-04 | Stop reason: HOSPADM

## 2017-05-01 RX ORDER — METOCLOPRAMIDE HYDROCHLORIDE 5 MG/ML
5 INJECTION INTRAMUSCULAR; INTRAVENOUS EVERY 6 HOURS PRN
Status: DISCONTINUED | OUTPATIENT
Start: 2017-05-01 | End: 2017-05-04 | Stop reason: HOSPADM

## 2017-05-01 RX ORDER — CLINDAMYCIN PHOSPHATE 900 MG/50ML
900 INJECTION, SOLUTION INTRAVENOUS
Status: COMPLETED | OUTPATIENT
Start: 2017-05-01 | End: 2017-05-01

## 2017-05-01 RX ORDER — ONDANSETRON 4 MG/1
4 TABLET, ORALLY DISINTEGRATING ORAL EVERY 6 HOURS PRN
Status: DISCONTINUED | OUTPATIENT
Start: 2017-05-01 | End: 2017-05-04 | Stop reason: HOSPADM

## 2017-05-01 RX ORDER — CLINDAMYCIN PHOSPHATE 900 MG/50ML
900 INJECTION, SOLUTION INTRAVENOUS EVERY 8 HOURS
Status: COMPLETED | OUTPATIENT
Start: 2017-05-01 | End: 2017-05-02

## 2017-05-01 RX ORDER — MAGNESIUM HYDROXIDE 1200 MG/15ML
LIQUID ORAL PRN
Status: DISCONTINUED | OUTPATIENT
Start: 2017-05-01 | End: 2017-05-01 | Stop reason: HOSPADM

## 2017-05-01 RX ORDER — SODIUM CHLORIDE, SODIUM LACTATE, POTASSIUM CHLORIDE, CALCIUM CHLORIDE 600; 310; 30; 20 MG/100ML; MG/100ML; MG/100ML; MG/100ML
INJECTION, SOLUTION INTRAVENOUS CONTINUOUS
Status: DISCONTINUED | OUTPATIENT
Start: 2017-05-01 | End: 2017-05-01 | Stop reason: HOSPADM

## 2017-05-01 RX ORDER — FENTANYL CITRATE 0.05 MG/ML
25-50 INJECTION, SOLUTION INTRAMUSCULAR; INTRAVENOUS
Status: DISCONTINUED | OUTPATIENT
Start: 2017-05-01 | End: 2017-05-01 | Stop reason: HOSPADM

## 2017-05-01 RX ORDER — PROPOFOL 10 MG/ML
INJECTION, EMULSION INTRAVENOUS CONTINUOUS PRN
Status: DISCONTINUED | OUTPATIENT
Start: 2017-05-01 | End: 2017-05-01

## 2017-05-01 RX ORDER — NALOXONE HYDROCHLORIDE 0.4 MG/ML
.1-.4 INJECTION, SOLUTION INTRAMUSCULAR; INTRAVENOUS; SUBCUTANEOUS
Status: DISCONTINUED | OUTPATIENT
Start: 2017-05-01 | End: 2017-05-04 | Stop reason: HOSPADM

## 2017-05-01 RX ORDER — DEXAMETHASONE SODIUM PHOSPHATE 4 MG/ML
INJECTION, SOLUTION INTRA-ARTICULAR; INTRALESIONAL; INTRAMUSCULAR; INTRAVENOUS; SOFT TISSUE PRN
Status: DISCONTINUED | OUTPATIENT
Start: 2017-05-01 | End: 2017-05-01

## 2017-05-01 RX ORDER — CLINDAMYCIN PHOSPHATE 900 MG/50ML
900 INJECTION, SOLUTION INTRAVENOUS SEE ADMIN INSTRUCTIONS
Status: DISCONTINUED | OUTPATIENT
Start: 2017-05-01 | End: 2017-05-01 | Stop reason: HOSPADM

## 2017-05-01 RX ORDER — LEVOTHYROXINE SODIUM 75 UG/1
75 TABLET ORAL
Status: DISCONTINUED | OUTPATIENT
Start: 2017-05-02 | End: 2017-05-04 | Stop reason: HOSPADM

## 2017-05-01 RX ORDER — TRIAMTERENE/HYDROCHLOROTHIAZID 37.5-25 MG
1 TABLET ORAL
Status: DISCONTINUED | OUTPATIENT
Start: 2017-05-01 | End: 2017-05-04 | Stop reason: HOSPADM

## 2017-05-01 RX ORDER — ALLOPURINOL 100 MG/1
100 TABLET ORAL
Status: DISCONTINUED | OUTPATIENT
Start: 2017-05-01 | End: 2017-05-04 | Stop reason: HOSPADM

## 2017-05-01 RX ORDER — OXYCODONE HYDROCHLORIDE 5 MG/1
5-10 TABLET ORAL
Status: DISCONTINUED | OUTPATIENT
Start: 2017-05-01 | End: 2017-05-04 | Stop reason: HOSPADM

## 2017-05-01 RX ORDER — LIDOCAINE HYDROCHLORIDE 20 MG/ML
INJECTION, SOLUTION INFILTRATION; PERINEURAL PRN
Status: DISCONTINUED | OUTPATIENT
Start: 2017-05-01 | End: 2017-05-01

## 2017-05-01 RX ORDER — ONDANSETRON 2 MG/ML
INJECTION INTRAMUSCULAR; INTRAVENOUS PRN
Status: DISCONTINUED | OUTPATIENT
Start: 2017-05-01 | End: 2017-05-01

## 2017-05-01 RX ORDER — ONDANSETRON 4 MG/1
4 TABLET, ORALLY DISINTEGRATING ORAL EVERY 30 MIN PRN
Status: DISCONTINUED | OUTPATIENT
Start: 2017-05-01 | End: 2017-05-01 | Stop reason: HOSPADM

## 2017-05-01 RX ORDER — ACETAMINOPHEN 325 MG/1
650 TABLET ORAL EVERY 4 HOURS PRN
Status: DISCONTINUED | OUTPATIENT
Start: 2017-05-04 | End: 2017-05-04 | Stop reason: HOSPADM

## 2017-05-01 RX ORDER — TETRACAINE HCL 10 MG/ML
INJECTION SUBARACHNOID PRN
Status: DISCONTINUED | OUTPATIENT
Start: 2017-05-01 | End: 2017-05-01

## 2017-05-01 RX ORDER — OXYCODONE HCL 10 MG/1
10 TABLET, FILM COATED, EXTENDED RELEASE ORAL ONCE
Status: COMPLETED | OUTPATIENT
Start: 2017-05-01 | End: 2017-05-01

## 2017-05-01 RX ORDER — METOCLOPRAMIDE 5 MG/1
5 TABLET ORAL EVERY 6 HOURS PRN
Status: DISCONTINUED | OUTPATIENT
Start: 2017-05-01 | End: 2017-05-04 | Stop reason: HOSPADM

## 2017-05-01 RX ORDER — TRIAMTERENE AND HYDROCHLOROTHIAZIDE 37.5; 25 MG/1; MG/1
1 CAPSULE ORAL
Status: DISCONTINUED | OUTPATIENT
Start: 2017-05-01 | End: 2017-05-01

## 2017-05-01 RX ORDER — ZOLPIDEM TARTRATE 5 MG/1
5 TABLET ORAL
Status: DISCONTINUED | OUTPATIENT
Start: 2017-05-01 | End: 2017-05-02

## 2017-05-01 RX ORDER — PROCHLORPERAZINE MALEATE 5 MG
5 TABLET ORAL EVERY 6 HOURS PRN
Status: DISCONTINUED | OUTPATIENT
Start: 2017-05-01 | End: 2017-05-04 | Stop reason: HOSPADM

## 2017-05-01 RX ORDER — LIDOCAINE 40 MG/G
CREAM TOPICAL
Status: DISCONTINUED | OUTPATIENT
Start: 2017-05-01 | End: 2017-05-04 | Stop reason: HOSPADM

## 2017-05-01 RX ORDER — ACETAMINOPHEN 500 MG
1000 TABLET ORAL ONCE
Status: COMPLETED | OUTPATIENT
Start: 2017-05-01 | End: 2017-05-01

## 2017-05-01 RX ORDER — DEXTROSE MONOHYDRATE, SODIUM CHLORIDE, AND POTASSIUM CHLORIDE 50; 1.49; 4.5 G/1000ML; G/1000ML; G/1000ML
INJECTION, SOLUTION INTRAVENOUS CONTINUOUS
Status: DISCONTINUED | OUTPATIENT
Start: 2017-05-01 | End: 2017-05-04 | Stop reason: HOSPADM

## 2017-05-01 RX ORDER — ONDANSETRON 2 MG/ML
4 INJECTION INTRAMUSCULAR; INTRAVENOUS EVERY 6 HOURS PRN
Status: DISCONTINUED | OUTPATIENT
Start: 2017-05-01 | End: 2017-05-04 | Stop reason: HOSPADM

## 2017-05-01 RX ORDER — HYDROMORPHONE HYDROCHLORIDE 1 MG/ML
.3-.5 INJECTION, SOLUTION INTRAMUSCULAR; INTRAVENOUS; SUBCUTANEOUS
Status: DISCONTINUED | OUTPATIENT
Start: 2017-05-01 | End: 2017-05-04 | Stop reason: HOSPADM

## 2017-05-01 RX ADMIN — GENTAMICIN SULFATE 1000 ML: 40 INJECTION, SOLUTION INTRAMUSCULAR; INTRAVENOUS at 08:10

## 2017-05-01 RX ADMIN — CLINDAMYCIN PHOSPHATE 900 MG: 18 INJECTION, SOLUTION INTRAVENOUS at 07:41

## 2017-05-01 RX ADMIN — LIDOCAINE HYDROCHLORIDE 60 MG: 20 INJECTION, SOLUTION INFILTRATION; PERINEURAL at 07:42

## 2017-05-01 RX ADMIN — HYDROMORPHONE HYDROCHLORIDE 0.5 MG: 1 INJECTION, SOLUTION INTRAMUSCULAR; INTRAVENOUS; SUBCUTANEOUS at 20:41

## 2017-05-01 RX ADMIN — PROPOFOL: 10 INJECTION, EMULSION INTRAVENOUS at 08:57

## 2017-05-01 RX ADMIN — SODIUM CHLORIDE, POTASSIUM CHLORIDE, SODIUM LACTATE AND CALCIUM CHLORIDE: 600; 310; 30; 20 INJECTION, SOLUTION INTRAVENOUS at 08:23

## 2017-05-01 RX ADMIN — Medication 5 MG: at 08:09

## 2017-05-01 RX ADMIN — PHENYLEPHRINE HYDROCHLORIDE 50 MCG: 10 INJECTION, SOLUTION INTRAMUSCULAR; INTRAVENOUS; SUBCUTANEOUS at 09:18

## 2017-05-01 RX ADMIN — PROCHLORPERAZINE EDISYLATE 5 MG: 5 INJECTION INTRAMUSCULAR; INTRAVENOUS at 15:54

## 2017-05-01 RX ADMIN — DEXAMETHASONE SODIUM PHOSPHATE 4 MG: 4 INJECTION, SOLUTION INTRA-ARTICULAR; INTRALESIONAL; INTRAMUSCULAR; INTRAVENOUS; SOFT TISSUE at 07:55

## 2017-05-01 RX ADMIN — PHENYLEPHRINE HYDROCHLORIDE 50 MCG: 10 INJECTION, SOLUTION INTRAMUSCULAR; INTRAVENOUS; SUBCUTANEOUS at 09:31

## 2017-05-01 RX ADMIN — Medication 10 MG: at 08:22

## 2017-05-01 RX ADMIN — PHENYLEPHRINE HYDROCHLORIDE 50 MCG: 10 INJECTION, SOLUTION INTRAMUSCULAR; INTRAVENOUS; SUBCUTANEOUS at 09:24

## 2017-05-01 RX ADMIN — ACETAMINOPHEN 975 MG: 325 TABLET, FILM COATED ORAL at 13:26

## 2017-05-01 RX ADMIN — PHENYLEPHRINE HYDROCHLORIDE 50 MCG: 10 INJECTION, SOLUTION INTRAMUSCULAR; INTRAVENOUS; SUBCUTANEOUS at 09:10

## 2017-05-01 RX ADMIN — WATER 1000 ML: 100 IRRIGANT IRRIGATION at 09:29

## 2017-05-01 RX ADMIN — PHENYLEPHRINE HYDROCHLORIDE 50 MCG: 10 INJECTION, SOLUTION INTRAMUSCULAR; INTRAVENOUS; SUBCUTANEOUS at 08:49

## 2017-05-01 RX ADMIN — PHENYLEPHRINE HYDROCHLORIDE 50 MCG: 10 INJECTION, SOLUTION INTRAMUSCULAR; INTRAVENOUS; SUBCUTANEOUS at 09:03

## 2017-05-01 RX ADMIN — PHENYLEPHRINE HYDROCHLORIDE 100 MCG: 10 INJECTION, SOLUTION INTRAMUSCULAR; INTRAVENOUS; SUBCUTANEOUS at 07:47

## 2017-05-01 RX ADMIN — PHENYLEPHRINE HYDROCHLORIDE 100 MCG: 10 INJECTION, SOLUTION INTRAMUSCULAR; INTRAVENOUS; SUBCUTANEOUS at 09:57

## 2017-05-01 RX ADMIN — PHENYLEPHRINE HYDROCHLORIDE 100 MCG: 10 INJECTION, SOLUTION INTRAMUSCULAR; INTRAVENOUS; SUBCUTANEOUS at 09:43

## 2017-05-01 RX ADMIN — ACETAMINOPHEN 1000 MG: 500 TABLET, FILM COATED ORAL at 07:03

## 2017-05-01 RX ADMIN — HYDROMORPHONE HYDROCHLORIDE 0.5 MG: 1 INJECTION, SOLUTION INTRAMUSCULAR; INTRAVENOUS; SUBCUTANEOUS at 17:35

## 2017-05-01 RX ADMIN — Medication 5 MG: at 07:55

## 2017-05-01 RX ADMIN — PHENYLEPHRINE HYDROCHLORIDE 100 MCG: 10 INJECTION, SOLUTION INTRAMUSCULAR; INTRAVENOUS; SUBCUTANEOUS at 07:54

## 2017-05-01 RX ADMIN — SODIUM CHLORIDE, POTASSIUM CHLORIDE, SODIUM LACTATE AND CALCIUM CHLORIDE: 600; 310; 30; 20 INJECTION, SOLUTION INTRAVENOUS at 09:34

## 2017-05-01 RX ADMIN — MIDAZOLAM HYDROCHLORIDE 1 MG: 1 INJECTION, SOLUTION INTRAMUSCULAR; INTRAVENOUS at 07:26

## 2017-05-01 RX ADMIN — Medication 5 MG: at 08:36

## 2017-05-01 RX ADMIN — MIDAZOLAM HYDROCHLORIDE 1 MG: 1 INJECTION, SOLUTION INTRAMUSCULAR; INTRAVENOUS at 07:30

## 2017-05-01 RX ADMIN — FENTANYL CITRATE 50 MCG: 50 INJECTION, SOLUTION INTRAMUSCULAR; INTRAVENOUS at 07:34

## 2017-05-01 RX ADMIN — PHENYLEPHRINE HYDROCHLORIDE 100 MCG: 10 INJECTION, SOLUTION INTRAMUSCULAR; INTRAVENOUS; SUBCUTANEOUS at 10:03

## 2017-05-01 RX ADMIN — Medication 10 MG: at 08:15

## 2017-05-01 RX ADMIN — HYDROMORPHONE HYDROCHLORIDE 0.5 MG: 1 INJECTION, SOLUTION INTRAMUSCULAR; INTRAVENOUS; SUBCUTANEOUS at 16:02

## 2017-05-01 RX ADMIN — PHENYLEPHRINE HYDROCHLORIDE 100 MCG: 10 INJECTION, SOLUTION INTRAMUSCULAR; INTRAVENOUS; SUBCUTANEOUS at 09:36

## 2017-05-01 RX ADMIN — POTASSIUM CHLORIDE, DEXTROSE MONOHYDRATE AND SODIUM CHLORIDE: 150; 5; 450 INJECTION, SOLUTION INTRAVENOUS at 23:57

## 2017-05-01 RX ADMIN — Medication 5 MG: at 07:58

## 2017-05-01 RX ADMIN — CLINDAMYCIN PHOSPHATE 900 MG: 18 INJECTION, SOLUTION INTRAVENOUS at 15:46

## 2017-05-01 RX ADMIN — TRANEXAMIC ACID 1 G: 100 INJECTION, SOLUTION INTRAVENOUS at 07:51

## 2017-05-01 RX ADMIN — ASPIRIN 81 MG 162 MG: 81 TABLET ORAL at 17:35

## 2017-05-01 RX ADMIN — Medication 5 MG: at 09:43

## 2017-05-01 RX ADMIN — ACETAMINOPHEN 975 MG: 325 TABLET, FILM COATED ORAL at 22:03

## 2017-05-01 RX ADMIN — POTASSIUM CHLORIDE, DEXTROSE MONOHYDRATE AND SODIUM CHLORIDE: 150; 5; 450 INJECTION, SOLUTION INTRAVENOUS at 15:46

## 2017-05-01 RX ADMIN — TETRACAINE HCL 1.2 ML: 10 INJECTION SUBARACHNOID at 07:41

## 2017-05-01 RX ADMIN — SODIUM CHLORIDE, POTASSIUM CHLORIDE, SODIUM LACTATE AND CALCIUM CHLORIDE: 600; 310; 30; 20 INJECTION, SOLUTION INTRAVENOUS at 07:00

## 2017-05-01 RX ADMIN — Medication 5 MG: at 08:49

## 2017-05-01 RX ADMIN — OXYCODONE HYDROCHLORIDE 10 MG: 5 TABLET ORAL at 19:11

## 2017-05-01 RX ADMIN — SODIUM CHLORIDE 2000 ML: 0.9 IRRIGANT IRRIGATION at 09:30

## 2017-05-01 RX ADMIN — OXYCODONE HYDROCHLORIDE 10 MG: 5 TABLET ORAL at 14:16

## 2017-05-01 RX ADMIN — Medication 5 MG: at 10:03

## 2017-05-01 RX ADMIN — PROPOFOL 30 MG: 10 INJECTION, EMULSION INTRAVENOUS at 07:42

## 2017-05-01 RX ADMIN — PHENYLEPHRINE HYDROCHLORIDE 50 MCG: 10 INJECTION, SOLUTION INTRAMUSCULAR; INTRAVENOUS; SUBCUTANEOUS at 08:36

## 2017-05-01 RX ADMIN — PHENYLEPHRINE HYDROCHLORIDE 50 MCG: 10 INJECTION, SOLUTION INTRAMUSCULAR; INTRAVENOUS; SUBCUTANEOUS at 08:09

## 2017-05-01 RX ADMIN — PROPOFOL 50 MCG/KG/MIN: 10 INJECTION, EMULSION INTRAVENOUS at 07:42

## 2017-05-01 RX ADMIN — OXYCODONE HYDROCHLORIDE 10 MG: 10 TABLET, FILM COATED, EXTENDED RELEASE ORAL at 07:03

## 2017-05-01 RX ADMIN — OXYCODONE HYDROCHLORIDE 10 MG: 5 TABLET ORAL at 22:33

## 2017-05-01 RX ADMIN — ONDANSETRON 4 MG: 2 INJECTION INTRAMUSCULAR; INTRAVENOUS at 09:47

## 2017-05-01 ASSESSMENT — LIFESTYLE VARIABLES: TOBACCO_USE: 0

## 2017-05-01 ASSESSMENT — ACTIVITIES OF DAILY LIVING (ADL): FALL_HISTORY_WITHIN_LAST_SIX_MONTHS: NO

## 2017-05-01 ASSESSMENT — ENCOUNTER SYMPTOMS: DYSRHYTHMIAS: 0

## 2017-05-01 NOTE — PLAN OF CARE
Problem: Goal Outcome Summary  Goal: Goal Outcome Summary  PT: Orders received, chart reviewed, eval completed and treatment initiated. 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 Pt is WBAT, posterior/ posterior lateral hip precautions. At baseline pt reports IND with all functional mobility and activities, lives in split level home with adult daughter, no stairs to enter main level, 6 stairs/1 rail to access basement and 2x6 stairs/1 rail to access bedroom and bathroom.      Surgeon Discharge Plan: None stated, pt reports planning to d/c home     Current Functional Status: Pt with increased pain prior to session, rates 7/10. Fair participate with JAIR exercises 2/2 pain. Educated/reviewed hip precautions, pt verbalizes understanding. Pt completes supine to sit with increased time and MinAx1 at R LE to maintain precautions with verbal cues. Pt completes sit<>stand with ModAx1, reports increased lightheadedness with activity and increased pain declined gait.      Barriers to Plan/Home: Stairs (not yet assessed), gait not yet assessed, post-op pain

## 2017-05-01 NOTE — IP AVS SNAPSHOT
[address] [facility]    [address] [place] [address]    Phone:  [phone]                                       After Visit Summary   5/1/2017    Stormy Bowman    MRN: [id number]           After Visit Summary Signature Page     I have received my discharge instructions, and my questions have been answered. I have discussed any challenges I see with this plan with the nurse or doctor.    ..........................................................................................................................................  Patient/Patient Representative Signature      ..........................................................................................................................................  Patient Representative Print Name and Relationship to Patient    ..................................................               ................................................  Date                                            Time    ..........................................................................................................................................  Reviewed by Signature/Title    ...................................................              ..............................................  Date                                                            Time

## 2017-05-01 NOTE — PROGRESS NOTES
Admission medication history interview status for the 5/1/2017  admission is complete. See EPIC admission navigator for prior to admission medications     Medication history source reliability:Good    Medication history interview source(s):Patient    Medication history resources (including written lists, pill bottles, clinic record): Patient mailed in a med list prior to day of procedure.    [facility]    Additional medication history information not noted on PTA med list :None    Time spent in this activity: 30 minutes    Prior to Admission medications    Medication Sig Last Dose Taking? Auth Provider   ZOLPIDEM TARTRATE PO Take 5-10 mg by mouth nightly as needed for sleep (patient takes 0.5 -1 X 10 mg = 5 to 10 mg dose) Over 1 month ago at prn Yes Reported, Patient   Celecoxib (CELEBREX PO) Take 200 mg by mouth every other day (at noon) Over 2 weeks ago at 12:00 Yes Reported, Patient   ATORVASTATIN CALCIUM PO Take 80 mg by mouth daily (with lunch) (at noon) 4/30/2017 at 1200 Yes Reported, Patient   Probiotic Product (ALIGN PO) Take 4 mg by mouth daily (with lunch) (at noon) 4/30/2017 at 1200 Yes Reported, Patient   Psyllium (WAL-MUCIL PO) Take 1 capsule by mouth 2 times daily (at 10:00 and 14:00) 4/30/2017 at 1000 Yes Reported, Patient   allopurinol (ZYLOPRIM) 100 MG tablet Take 100 mg by mouth daily (with lunch) (at noon) 4/30/2017 at 1200 Yes Reported, Patient   triamterene-hydrochlorothiazide (DYAZIDE) 37.5-25 MG per capsule Take 1 capsule by mouth daily (with dinner)  4/30/2017 at 1800 Yes Reported, Patient   Potassium Chloride Elli CR (K-DUR PO) Take 10 mEq by mouth daily (with lunch) (at noon) 4/30/2017 at 1200 Yes Unknown, Entered By History   Cholecalciferol (VITAMIN D3 PO) Take 2,000 Units by mouth daily (with dinner)  4/30/2017 at 1800 Yes Unknown, Entered By History   ASPIRIN PO Take 162 mg by mouth daily (with dinner) (Takes 2 x 81mg tablet = 162mg) Over 1 week ago at   1800 Yes Unknown, Entered By History   Levothyroxine Sodium (SYNTHROID PO) Take 75 mcg by mouth every morning  5/1/2017 at 0500 Yes Reported, Patient   DiphenhydrAMINE HCl (BENADRYL DYE-FREE ALLERGY PO) Take 50 mg by mouth At Bedtime (Takes x 25mg tablet = 50mg dose) 4/29/2017 at 2200 Yes Reported, Patient

## 2017-05-01 NOTE — PROGRESS NOTES
05/01/17 1600   Quick Adds   Type of Visit Initial PT Evaluation   Living Environment   Lives With child(eder), adult  (daughter)   Living Arrangements house  (split level)   Home Accessibility stairs within home;tub/shower is not walk in   Number of Stairs to Enter Home 0   Number of Stairs Within Home 12  (2x6 to bedroom/bathroom, 1x6 to basement)   Stair Railings at Home inside, present on right side;inside, present on left side   Transportation Available car;family or friend will provide  (pt drives at baseline)   Living Environment Comment Pt lives in a split level home with one of her adult daughters, is retired, was IND with all daily activities and mobility prior to surgery   Self-Care   Usual Activity Tolerance moderate   Current Activity Tolerance poor   Regular Exercise no   Equipment Currently Used at Home shower chair   Activity/Exercise/Self-Care Comment does own a FWW from previous surgeries   Functional Level Prior   Ambulation 0-->independent   Transferring 0-->independent   Toileting 0-->independent   Bathing 1-->assistive equipment  (uses shower chair)   Dressing 0-->independent   Eating 0-->independent   Communication 0-->understands/communicates without difficulty   Swallowing 0-->swallows foods/liquids without difficulty   Cognition 0 - no cognition issues reported   Fall history within last six months no   Which of the above functional risks had a recent onset or change? ambulation;transferring   General Information   Onset of Illness/Injury or Date of Surgery - Date 05/01/17   Referring Physician Wilman Kramer MD   Patient/Family Goals Statement pt reports home   Pertinent History of Current Problem (include personal factors and/or comorbidities that impact the POC) POD0 R JAIR/ revision 2/2 failed hardware, pt had L JAIR revision done in 2011, reports original B THAs completed >10 years ago,  reports did not need rehab stays after any of previous surgeries    Precautions/Limitations fall precautions;right hip precautions;oxygen therapy device and L/min  (2L O2 via NC)   Weight-Bearing Status - RLE weight-bearing as tolerated   General Observations Pt is pleasant, limited by pain at this time   General Info Comments Activity: ambulate with assist   Cognitive Status Examination   Orientation orientation to person, place and time   Level of Consciousness alert;lethargic/somnolent  (lethargic at end of session, pain med given at start of eval)   Follows Commands and Answers Questions 100% of the time;able to follow multistep instructions   Personal Safety and Judgment intact   Memory intact   Pain Assessment   Patient Currently in Pain Yes, see Vital Sign flowsheet   Integumentary/Edema   Integumentary/Edema Comments R hip incision covered, drain present   Posture    Posture Forward head position;Protracted shoulders   Range of Motion (ROM)   ROM Comment L LE WFL, R LE WFL within hip precautions, requires increased time 2/2 pain   Strength   Strength Comments L LE WFL, pt requires assist to complete SAQ to R LE   Bed Mobility   Bed Mobility Comments pt completes supine to sit with MinAx1, requires verbal cues and physical assist to maintain hip precautions   Transfer Skills   Transfer Comments pt completes sit<>stand with ModAx1   Gait   Gait Comments not assessed   Balance   Balance Comments pt with good seated balance without UE support, pt requires MinAx1 to maintain standing balance with B UE support to FWW, limited WBing to R LE demonstrated   Sensory Examination   Sensory Perception Comments pt reports numbness present in R LE, pt also intact to Light touch to R LE at toes, shin, knee and thigh   General Therapy Interventions   Planned Therapy Interventions balance training;bed mobility training;gait training;neuromuscular re-education;ROM;strengthening;transfer training;home program guidelines;progressive activity/exercise   Clinical Impression   Criteria for  "Skilled Therapeutic Intervention yes, treatment indicated   PT Diagnosis impaired functional mobility; difficulty with gait   Influenced by the following impairments decreased strength, ROM, activity tolerance, balance, increased post op pain   Functional limitations due to impairments difficulty with bed mobility, transfers, gait, and stairs   Clinical Presentation Evolving/Changing   Clinical Presentation Rationale uncontrolled pain during evaluation, POD0 R JAIR/revision, pt with L JAIR revision in 2011   Clinical Decision Making (Complexity) Moderate complexity   Therapy Frequency` 2 times/day   Predicted Duration of Therapy Intervention (days/wks) 4 days   Anticipated Discharge Disposition Home with Assist;Home with Home Therapy  (pending gait assessment)   Risk & Benefits of therapy have been explained Yes   Patient, Family & other staff in agreement with plan of care Yes   [facility] [employer] \"6 Clicks\"   2016, Trustees of [facility], under license to [employer].  All rights reserved.   6 Clicks Short Forms Basic Mobility Inpatient Short Form   [facility] [employer]  \"6 Clicks\" V.2 Basic Mobility Inpatient Short Form   1. Turning from your back to your side while in a flat bed without using bedrails? 4 - None   2. Moving from lying on your back to sitting on the side of a flat bed without using bedrails? 3 - A Little   3. Moving to and from a bed to a chair (including a wheelchair)? 2 - A Lot   4. Standing up from a chair using your arms (e.g., wheelchair, or bedside chair)? 3 - A Little   5. To walk in hospital room? 2 - A Lot   6. Climbing 3-5 steps with a railing? 1 - Total   Basic Mobility Raw Score (Score out of 24.Lower scores equate to lower levels of function) 15   Total Evaluation Time   Total Evaluation Time (Minutes) 10     "

## 2017-05-01 NOTE — PLAN OF CARE
Problem: Goal Outcome Summary  Goal: Goal Outcome Summary  Outcome: Improving  Pain has been difficult to control but is slowly improving, dressing clean, dry and intact, CMS is WNL, VSS, taking  fluids but has minor c/o  nausea. Marcelino and Hemovac intact and patent. Pt able to sit and stand at beside with PT, tolerated fairly well.

## 2017-05-01 NOTE — ANESTHESIA CARE TRANSFER NOTE
Patient: Stormy Bowman    Procedure(s):  REVISION RIGHT TOTAL HIP ARTHROPLASTY  - Wound Class: I-Clean    Diagnosis: FAILED HARDWARE RIGHT HIP  Diagnosis Additional Information: No value filed.    Anesthesia Type:   Spinal     Note:  Airway :Face Mask  Patient transferred to:PACU  Comments: Pt to PACU with O2 via mask, airway patent, VSS.  Report to RN.      Vitals: (Last set prior to Anesthesia Care Transfer)    CRNA VITALS  5/1/2017 0947 - 5/1/2017 1025      5/1/2017             Resp Rate (set): 10                Electronically Signed By: DOLORES Swenson CRNA  May 1, 2017  10:25 AM

## 2017-05-01 NOTE — IP AVS SNAPSHOT
MRN:[id number]                      After Visit Summary   5/1/2017    Stormy Bowman    MRN: [id number]           Thank you!     Thank you for choosing [facility] for your care. Our goal is always to provide you with excellent care. Hearing back from our patients is one way we can continue to improve our services. Please take a few minutes to complete the written survey that you may receive in the mail after you visit with us. Thank you!        Patient Information     Date Of Birth          1945        Designated Caregiver       Most Recent Value    Caregiver    Will someone help with your care after discharge? yes    Name of designated caregiver Melissa daughter    Phone number of caregiver [phone]    Caregiver address see demographic sheet      About your hospital stay     You were admitted on:  May 1, 2017 You last received care in the:  [address] Specialty Unit    You were discharged on:  May 4, 2017        Reason for your hospital stay       Revision right total hip replacement            Reason for your hospital stay       Revision of right hip replacement                  Who to Call     For medical emergencies, please call 911.  For non-urgent questions about your medical care, please call your primary care provider or clinic, [phone]  For questions related to your surgery, please call your surgery clinic        Attending Provider     Provider Specialty    Wilman Kramer MD Orthopedics       Primary Care Provider Office Phone # Fax #    Oscar Ivana Munoz [phone]       [facility] [address]  [facility] [address]        After Care Instructions     Activity       Your activity upon discharge: activity as tolerated--use a walker.            Activity - Up with nursing assistance           Advance Diet as Tolerated       Follow this diet upon discharge: Orders Placed This Encounter      Advance Diet as Tolerated: Regular Diet  Adult            Diet       Follow this diet upon discharge: Orders Placed This Encounter      Advance Diet as Tolerated: Regular Diet Adult      Advance Diet as Tolerated            General info for SNF       Length of Stay Estimate: Short Term Care: Estimated # of Days <30  Condition at Discharge: Improving  Level of care:skilled   Rehabilitation Potential: Excellent  Admission H&P remains valid and up-to-date: Yes  Recent Chemotherapy: N/A  Use Nursing Home Standing Orders: Yes            Mantoux instructions       Give two-step Mantoux (PPD) Per Facility Policy Yes            Weight bearing status       Full weight bearing            Wound care (specify)       Site:   Right hip  Instructions:  Daily dry dressing            Wound care and dressings       Instructions to care for your wound at home: daily dressing changes.                  Follow-up Appointments     Follow Up and recommended labs and tests       Follow up with me in 2 weeks.  No follow up labs or test are needed.            Follow-up and recommended labs and tests        Follow up with me,  Wilman Kramer, within 2 weeks. to evaluate after surgery.  No follow up labs or test are needed.                  Additional Services     Occupational Therapy Adult Consult       Evaluate and treat as clinically indicated.    Reason:  Hip replacement            Physical Therapy Adult Consult       Evaluate and treat as clinically indicated.    Reason:  Hip replacement                  Future tests that were ordered for you     AntiEmbolism Stockings       Bilateral below knee length.On in the morning, off at night                  Pending Results     No orders found from 4/29/2017 to 5/2/2017.            Statement of Approval     Ordered          05/04/17 0705  I have reviewed and agree with all the recommendations and orders detailed in this document.  EFFECTIVE NOW     Approved and electronically signed by:  Wilman Kramer MD            "17 1004  I have reviewed and agree with all the recommendations and orders detailed in this document.  EFFECTIVE NOW     Approved and electronically signed by:  Wilman Kramer MD             Admission Information     Date & Time Provider Department Dept. Phone    2017 Wilman Kramer MD [place] [address] Ortho Specialty Unit [phone]      Your Vitals Were     Blood Pressure Pulse Temperature Respirations Height Weight    107/63 (BP Location: Right arm) 82 98.2  F (36.8  C) (Oral) 16 1.588 m (5' 2.5\") 80 kg (176 lb 5.9 oz)    Pulse Oximetry BMI (Body Mass Index)                93% 31.74 kg/m2          T[employer]ored [employer] Information     [email or web address] lets you send messages to your doctor, view your test results, renew your prescriptions, schedule appointments and more. To sign up, go to [email or web address] . Click on \"Log in\" on the left side of the screen, which will take you to the Welcome page. Then click on \"Sign up Now\" on the right side of the page.     You will be asked to enter the access code listed below, as well as some personal information. Please follow the directions to create your username and password.     Your access code is: [email or web address]  Expires: 2017  1:18 PM     Your access code will  in 90 days. If you need help or a new code, please call your [facility] clinic or [phone].        Care EveryWhere ID     This is your Care EveryWhere ID. This could be used by other organizations to access your [facility] medical records  [id number]           Review of your medicines      START taking        Dose / Directions    enoxaparin 40 MG/0.4ML injection   Commonly known as:  LOVENOX        Dose:  40 mg   Inject 0.4 mLs (40 mg) Subcutaneous every 24 hours for 14 days   Refills:  0       oxyCODONE 5 MG IR tablet   Commonly known as:  ROXICODONE        Dose:  5-10 mg   Take 1-2 tablets (5-10 mg) by mouth every 3 hours as needed for moderate to severe pain   Quantity:  60 " tablet   Refills:  0         CONTINUE these medicines which have NOT CHANGED        Dose / Directions    ALIGN PO        Dose:  4 mg   Take 4 mg by mouth daily (with lunch) (at noon)   Refills:  0       allopurinol 100 MG tablet   Commonly known as:  ZYLOPRIM        Dose:  100 mg   Take 100 mg by mouth daily (with lunch) (at noon)   Refills:  0       ASPIRIN PO        Dose:  162 mg   Take 162 mg by mouth daily (with dinner) (Takes 2 x 81mg tablet = 162mg)   Refills:  0       ATORVASTATIN CALCIUM PO        Dose:  80 mg   Take 80 mg by mouth daily (with lunch) (at noon)   Refills:  0       BENADRYL DYE-FREE ALLERGY PO        Dose:  50 mg   Take 50 mg by mouth At Bedtime (Takes x 25mg tablet = 50mg dose)   Refills:  0       K-DUR PO        Dose:  10 mEq   Take 10 mEq by mouth daily (with lunch) (at noon)   Refills:  0       SYNTHROID PO        Dose:  75 mcg   Take 75 mcg by mouth every morning   Refills:  0       triamterene-hydrochlorothiazide 37.5-25 MG per capsule   Commonly known as:  DYAZIDE        Dose:  1 capsule   Take 1 capsule by mouth daily (with dinner)   Refills:  0       VITAMIN D3 PO        Dose:  2000 Units   Take 2,000 Units by mouth daily (with dinner)   Refills:  0       WAL-MUCIL PO        Dose:  1 capsule   Take 1 capsule by mouth 2 times daily (at 10:00 and 14:00)   Refills:  0       ZOLPIDEM TARTRATE PO        Dose:  5-10 mg   Take 5-10 mg by mouth nightly as needed for sleep (patient takes 0.5 -1 X 10 mg = 5 to 10 mg dose)   Refills:  0         STOP taking     CELEBREX PO                Where to get your medicines      Some of these will need a paper prescription and others can be bought over the counter. Ask your nurse if you have questions.     You don't need a prescription for these medications     enoxaparin 40 MG/0.4ML injection         Information about where to get these medications is not yet available     ! Ask your nurse or doctor about these medications     oxyCODONE 5 MG IR tablet                 Protect others around you: Learn how to safely use, store and throw away your medicines at www.disposemymeds.org.             Medication List: This is a list of all your medications and when to take them. Check marks below indicate your daily home schedule. Keep this list as a reference.      Medications           Morning Afternoon Evening Bedtime As Needed    ALIGN PO   Take 4 mg by mouth daily (with lunch) (at noon)                                allopurinol 100 MG tablet   Commonly known as:  ZYLOPRIM   Take 100 mg by mouth daily (with lunch) (at noon)   Last time this was given:  100 mg on 5/3/2017 11:57 AM                                   ASPIRIN PO   Take 162 mg by mouth daily (with dinner) (Takes 2 x 81mg tablet = 162mg)   Last time this was given:  162 mg on 5/3/2017  5:13 PM                                   ATORVASTATIN CALCIUM PO   Take 80 mg by mouth daily (with lunch) (at noon)   Last time this was given:  80 mg on 5/3/2017 11:57 AM                                   BENADRYL DYE-FREE ALLERGY PO   Take 50 mg by mouth At Bedtime (Takes x 25mg tablet = 50mg dose)   Last time this was given:  50 mg on 5/2/2017  8:46 PM                                   enoxaparin 40 MG/0.4ML injection   Commonly known as:  LOVENOX   Inject 0.4 mLs (40 mg) Subcutaneous every 24 hours for 14 days   Last time this was given:  40 mg on 5/4/2017  8:25 AM                                   K-DUR PO   Take 10 mEq by mouth daily (with lunch) (at noon)   Last time this was given:  10 mEq on 5/3/2017 11:57 AM                                   oxyCODONE 5 MG IR tablet   Commonly known as:  ROXICODONE   Take 1-2 tablets (5-10 mg) by mouth every 3 hours as needed for moderate to severe pain   Last time this was given:  10 mg on 5/4/2017  9:26 AM                                   SYNTHROID PO   Take 75 mcg by mouth every morning   Last time this was given:  75 mcg on 5/4/2017  7:47 AM                                    triamterene-hydrochlorothiazide 37.5-25 MG per capsule   Commonly known as:  DYAZIDE   Take 1 capsule by mouth daily (with dinner)                                VITAMIN D3 PO   Take 2,000 Units by mouth daily (with dinner)                                WAL-MUCIL PO   Take 1 capsule by mouth 2 times daily (at 10:00 and 14:00)   Last time this was given:  0.52 g on 5/4/2017  9:26 AM                                   ZOLPIDEM TARTRATE PO   Take 5-10 mg by mouth nightly as needed for sleep (patient takes 0.5 -1 X 10 mg = 5 to 10 mg dose)

## 2017-05-01 NOTE — BRIEF OP NOTE
[facility] Brief Operative Note    Pre-operative diagnosis: FAILED HARDWARE RIGHT HIP   Post-operative diagnosis failed right total hip replacement      Procedure: Procedure(s):  REVISION RIGHT TOTAL HIP ARTHROPLASTY  - Wound Class: I-Clean   Surgeon(s): Surgeon(s) and Role:     * Wilman Kramer MD - Primary     * Tank Oropeza Jr. - Assisting   Estimated blood loss: 450 mL    Specimens: * No specimens in log *   Findings: Please see the full operative report.

## 2017-05-01 NOTE — ANESTHESIA PREPROCEDURE EVALUATION
Anesthesia Evaluation     . Pt has had prior anesthetic.     No history of anesthetic complications          ROS/MED HX    ENT/Pulmonary:      (-) tobacco use and sleep apnea   Neurologic:       Cardiovascular:     (+) Dyslipidemia, hypertension----. : . . . :. .      (-) arrhythmias and irregular heartbeat/palpitations   METS/Exercise Tolerance:     Hematologic:         Musculoskeletal:         GI/Hepatic:        (-) GERD   Renal/Genitourinary:     (+) chronic renal disease, type: CRI, Pt does not require dialysis,       Endo:     (+) thyroid problem hypothyroidism, .      Psychiatric:     (+) psychiatric history depression      Infectious Disease:         Malignancy:         Other:                     Physical Exam  Normal systems: cardiovascular and pulmonary    Airway   Mallampati: II  TM distance: >3 FB  Neck ROM: full    Dental   Comment: native    Cardiovascular       Pulmonary                     Anesthesia Plan      History & Physical Review  History and physical reviewed and following examination; no interval change.    ASA Status:  2 .    NPO Status:  > 8 hours    Plan for Spinal   PONV prophylaxis:  Ondansetron (or other 5HT-3)       Postoperative Care      Consents  Anesthetic plan, risks, benefits and alternatives discussed with:  Patient..                          .

## 2017-05-01 NOTE — ANESTHESIA POSTPROCEDURE EVALUATION
Patient: Stormy Bowman    Procedure(s):  REVISION RIGHT TOTAL HIP ARTHROPLASTY  - Wound Class: I-Clean    Diagnosis:FAILED HARDWARE RIGHT HIP  Diagnosis Additional Information: No value filed.    Anesthesia Type:  Spinal    Note:  Anesthesia Post Evaluation    Patient location during evaluation: PACU  Patient participation: Able to fully participate in evaluation  Level of consciousness: awake  Pain management: adequate  Cardiovascular status: acceptable  Respiratory status: acceptable  Hydration status: acceptable  PONV: none     Anesthetic complications: None          Last vitals:  Vitals:    05/01/17 1315 05/01/17 1415 05/01/17 1542   BP: 122/56 113/71 97/59   Resp: 16 16 16   Temp:   36.3  C (97.3  F)   SpO2: 97% 98% 96%         Electronically Signed By: RONA AMOS  May 1, 2017  5:19 PM

## 2017-05-01 NOTE — OR NURSING
MD explanted femur, femur head and liner from right hip for revision surgery. New implants inserted see implant record.

## 2017-05-02 ENCOUNTER — APPOINTMENT (OUTPATIENT)
Dept: PHYSICAL THERAPY | Facility: CLINIC | Age: 72
DRG: 467 | End: 2017-05-02
Attending: ORTHOPAEDIC SURGERY
Payer: MEDICARE

## 2017-05-02 LAB
BLD PROD TYP BPU: NORMAL
BLD UNIT ID BPU: 0
BLOOD PRODUCT CODE: NORMAL
BPU ID: NORMAL
HGB BLD-MCNC: 10 G/DL (ref 11.7–15.7)
TRANSFUSION STATUS PATIENT QL: NORMAL

## 2017-05-02 PROCEDURE — 36415 COLL VENOUS BLD VENIPUNCTURE: CPT | Performed by: ORTHOPAEDIC SURGERY

## 2017-05-02 PROCEDURE — 97110 THERAPEUTIC EXERCISES: CPT | Mod: GP

## 2017-05-02 PROCEDURE — 97530 THERAPEUTIC ACTIVITIES: CPT | Mod: GP

## 2017-05-02 PROCEDURE — 12000007 ZZH R&B INTERMEDIATE

## 2017-05-02 PROCEDURE — 25000128 H RX IP 250 OP 636: Performed by: ORTHOPAEDIC SURGERY

## 2017-05-02 PROCEDURE — 85018 HEMOGLOBIN: CPT | Performed by: ORTHOPAEDIC SURGERY

## 2017-05-02 PROCEDURE — 40000193 ZZH STATISTIC PT WARD VISIT

## 2017-05-02 PROCEDURE — 25800025 ZZH RX 258: Performed by: ORTHOPAEDIC SURGERY

## 2017-05-02 PROCEDURE — 25000125 ZZHC RX 250: Performed by: ORTHOPAEDIC SURGERY

## 2017-05-02 PROCEDURE — S0077 INJECTION, CLINDAMYCIN PHOSP: HCPCS | Performed by: ORTHOPAEDIC SURGERY

## 2017-05-02 PROCEDURE — 25000132 ZZH RX MED GY IP 250 OP 250 PS 637: Mod: GY | Performed by: ORTHOPAEDIC SURGERY

## 2017-05-02 PROCEDURE — A9270 NON-COVERED ITEM OR SERVICE: HCPCS | Mod: GY | Performed by: ORTHOPAEDIC SURGERY

## 2017-05-02 RX ORDER — DIPHENHYDRAMINE HCL 50 MG
50 CAPSULE ORAL EVERY 6 HOURS PRN
Status: DISCONTINUED | OUTPATIENT
Start: 2017-05-02 | End: 2017-05-04 | Stop reason: HOSPADM

## 2017-05-02 RX ADMIN — HYDROMORPHONE HYDROCHLORIDE 0.5 MG: 1 INJECTION, SOLUTION INTRAMUSCULAR; INTRAVENOUS; SUBCUTANEOUS at 09:34

## 2017-05-02 RX ADMIN — POTASSIUM CHLORIDE 10 MEQ: 750 TABLET, EXTENDED RELEASE ORAL at 11:44

## 2017-05-02 RX ADMIN — ALLOPURINOL 100 MG: 100 TABLET ORAL at 11:44

## 2017-05-02 RX ADMIN — OXYCODONE HYDROCHLORIDE 10 MG: 5 TABLET ORAL at 07:43

## 2017-05-02 RX ADMIN — PSYLLIUM HUSK 0.52 G: 20 CAPSULE ORAL at 14:31

## 2017-05-02 RX ADMIN — ENOXAPARIN SODIUM 40 MG: 40 INJECTION SUBCUTANEOUS at 09:34

## 2017-05-02 RX ADMIN — LEVOTHYROXINE SODIUM 75 MCG: 75 TABLET ORAL at 06:31

## 2017-05-02 RX ADMIN — OXYCODONE HYDROCHLORIDE 10 MG: 5 TABLET ORAL at 16:52

## 2017-05-02 RX ADMIN — ASPIRIN 81 MG 162 MG: 81 TABLET ORAL at 17:21

## 2017-05-02 RX ADMIN — POTASSIUM CHLORIDE, DEXTROSE MONOHYDRATE AND SODIUM CHLORIDE: 150; 5; 450 INJECTION, SOLUTION INTRAVENOUS at 09:34

## 2017-05-02 RX ADMIN — OXYCODONE HYDROCHLORIDE 10 MG: 5 TABLET ORAL at 03:01

## 2017-05-02 RX ADMIN — CLINDAMYCIN PHOSPHATE 900 MG: 18 INJECTION, SOLUTION INTRAVENOUS at 00:00

## 2017-05-02 RX ADMIN — PSYLLIUM HUSK 0.52 G: 20 CAPSULE ORAL at 09:34

## 2017-05-02 RX ADMIN — ATORVASTATIN CALCIUM 80 MG: 80 TABLET, FILM COATED ORAL at 11:44

## 2017-05-02 RX ADMIN — ACETAMINOPHEN 975 MG: 325 TABLET, FILM COATED ORAL at 20:46

## 2017-05-02 RX ADMIN — SODIUM CHLORIDE 1000 ML: 9 INJECTION, SOLUTION INTRAVENOUS at 14:32

## 2017-05-02 RX ADMIN — DIPHENHYDRAMINE HYDROCHLORIDE 50 MG: 50 CAPSULE ORAL at 20:46

## 2017-05-02 RX ADMIN — OXYCODONE HYDROCHLORIDE 10 MG: 5 TABLET ORAL at 20:29

## 2017-05-02 RX ADMIN — ACETAMINOPHEN 975 MG: 325 TABLET, FILM COATED ORAL at 14:31

## 2017-05-02 RX ADMIN — POTASSIUM CHLORIDE, DEXTROSE MONOHYDRATE AND SODIUM CHLORIDE: 150; 5; 450 INJECTION, SOLUTION INTRAVENOUS at 21:43

## 2017-05-02 RX ADMIN — ACETAMINOPHEN 975 MG: 325 TABLET, FILM COATED ORAL at 06:31

## 2017-05-02 RX ADMIN — OXYCODONE HYDROCHLORIDE 10 MG: 5 TABLET ORAL at 11:44

## 2017-05-02 NOTE — PLAN OF CARE
Problem: Goal Outcome Summary  Goal: Goal Outcome Summary  Outcome: No Change  VSS. A&O x4. CMS intact. IVF infusing. Tolerating RD. Oxy for pain. Due to void more. Dressing CDI. Will continue to monitor.

## 2017-05-02 NOTE — PROGRESS NOTES
Stormy Bowman  2017  POD #1    Doing well.  Pain well-controlled.  Tolerating physical therapy and rehabilitation well.  Temperatures:  Current - Temp: 98  F (36.7  C); Max - Temp  Av  F (36.1  C)  Min: 96.7  F (35.9  C)  Max: 98  F (36.7  C)  Pulse range: Pulse  Av  Min: 82  Max: 82  Blood pressure range: Systolic (24hrs), Av , Min:97 , Max:122   ; Diastolic (24hrs), Av, Min:56, Max:83    CMS: intact  Labs:The morning hemoglobin is pending. X-rays look great.    PLAN:  Continue physical therapy  Discharge plan: Home Thursday

## 2017-05-02 NOTE — OP NOTE
DATE OF PROCEDURE:  05/01/2017      PREOPERATIVE DIAGNOSIS:  Failed right total hip replacement.      POSTOPERATIVE DIAGNOSIS:  Failed right total hip replacement.      PROCEDURE:  Revision right total hip replacement.      SURGEON:  Wilman Kramer Jr, MD      ASSISTANT:   CINDY Bennett      ANESTHESIA:  Spinal.      DESCRIPTION OF PROCEDURE:  Stormy Bowman was brought to the operating room and positioned initially supine on the operating room table.  Prophylactic IV antibiotics and tranexamic acid were administered.  She was placed in the seated position for induction of spinal anesthesia and placed supine again for placement of a Marcelino catheter.  She was then rolled to the left lateral decubitus position.  An axillary roll and pelvic positioner are utilized.  The down extremities were very carefully padded.  The right lower extremity, buttock, groin, and flank were all prepped and draped in customary fashion.      A brief timeout was held to verify the procedure and laterality.        Her previous posterior approach incisional scar is reincised, and lengthened a little bit proximally and considerably distally.  Dissection was carried down to the muscular fascia which is divided in line with the skin incision.  The scarred bursa is divided.  The Charnley retractor was placed.  The posterior pseudocapsule was dissected directly off bone.  There are absolutely no signs of infection.  The tissues looked very benign, there is only a little bit of clear fluid in the joint.  Based on a propensity for false positives in our laboratory, it was decided based on clinical grounds not to send cultures.      The pseudocapsule was gradually debrided.  There is abundant granulomatous tissue, which was also debrided away.  We were then able to dislocate the hip.  A drift and mallet were used to attempt to remove the femoral head from the stem, but stem was so loose that the whole stem wants to come with the head.  We  therefore carefully dissected the soft tissues within the medial aspect of the greater trochanter, so we can take the stem out without any danger of femur breakage.  This was achieved without difficulty.  The acetabular liner is then removed, and dissection was carried around the rim of the acetabular shell.  As there are no signs of loosening, elected to keep this component in place.  A trial size 22 Biomet liner with a 32 mm internal diameter and high wall was then placed.      Attention was then turned back to the femur.  We start removing cement from the top, but it can be seen that there is too much to safely remove the cement without danger of fracturing through her abundant areas of osteolysis.  We, therefore, elect to do this extended trochanteric osteotomy.      The lateralis muscle is dissected up off of the lateral intermuscular septum and perforators are cauterized.  We leave the attachment intact at the vastus ridge.  A pencil bur was then used to perform the osteotomy, leaving a very wide span up directly at the greater trochanter.  Once this was done, we were able to first remove large amounts of cement from the osteotomized piece, and then from the femoral shaft.  There are abundant areas of osteolysis where the bone was quite thin.      The pencil bur was then used to break through a small amount of distal cement plug, and this was followed by serial Marion drills until all of the distal cement plug and cement restrictor are removed.      The wound was copiously irrigated.  The osteotomy was then reduced, and held provisionally with Verbrugge clamps.  Two Biomet cables are placed around the osteotomy, prophylactic cable was placed just distal to the osteotomy.  We then elected to use the Biomet Katie system to reconstruct the femur.  We ream up to a size 14 x 190 stem, with excellent purchase obtained distally.  Trial reductions were then performed with a size B x 70 body, and based on trial  reductions, we choose the standard femoral head.  We get excellent stability.      The trial components were then removed.  The actual 14 x 190 Katie stem was placed.  We then trial again with the size B x 70 mm body and the standard femoral head, again with excellent stability.  The trial B body is then removed and replaced with the real body which is impacted on the taper and then held with the fixation screw.  Based on trial reductions, we choose the standard femoral head.  At final reduction, we appear to have equal leg lengths.  At 90 degrees of flexion, we can rotate the hip more than 70 degrees, and it is stable in external rotation and extension.      It is elected to close.  The real size 22 acetabular high wall liner with a 32 mm internal diameter is placed, and the standard femoral head was placed, 32 mm in diameter.  Final reduction was performed and the above measurements were repeated.      A liter of dilute Betadine solution was then placed in the wound and allowed to sit for 3 minutes.  This was suctioned away and irrigated with antibiotic solution.  The lateralis fascia was closed with a running 0 Vicryl, interrupted 0 Ethibond figure-of-eight sutures were used in the deep fascia.  Subcutaneous tissues were closed with 2-0 Vicryl, and the skin was reapproximated with skin clips.  A bulky sterile dressing and abduction pillow were applied.  The patient was taken to the recovery room in satisfactory condition.  Final counts are correct.         NINOSKA CHAPMAN JR, MD             D: 2017 10:06   T: 2017 04:05   MT: [id number]      Name:     ROJELIO MCCRARY   MRN:      [id number]        Account:        [id number]   :      1945           Procedure Date: 2017      Document: [id number]

## 2017-05-02 NOTE — PROVIDER NOTIFICATION
"Patient having light headedness--unable to completer PT. Denies nausea. Feels \"woozy.\" BP readings 110/63. Did void 150cc thus far.Dr. Kramer notified. NS bolus started. Will monitor,  "

## 2017-05-02 NOTE — PLAN OF CARE
"Problem: Goal Outcome Summary  Goal: Goal Outcome Summary  Surgeon Discharge Plan: \"Home Thursday\"     Current Functional Status: Pt performed supine to sit with mod A for LEs, cues for scooting. Required CGA sit to stand with FWW and only able to stand approx 5 sec for 2 tries due to dizziness and needed to lay down. /60. Unable to ambulate this session. Participated well in strengthening activities and stated pain was improved from yesterday.     Barriers to Plan/Home: Stairs, dizziness, needs help with mobility, unable to ambulate.             "

## 2017-05-02 NOTE — PLAN OF CARE
Problem: Goal Outcome Summary  Goal: Goal Outcome Summary  Outcome: Improving  Pt is A & O. VSS. CMS intact. WBAT. Pain managed with IV dilaudid, oxycodone and Tylenol. Dressing is c/d/i. 2 L O2. Adequate urine output. Marcelino catheter dc'd and is due to void. Progressing well per plan of care.

## 2017-05-03 ENCOUNTER — APPOINTMENT (OUTPATIENT)
Dept: PHYSICAL THERAPY | Facility: CLINIC | Age: 72
DRG: 467 | End: 2017-05-03
Attending: ORTHOPAEDIC SURGERY
Payer: MEDICARE

## 2017-05-03 ENCOUNTER — APPOINTMENT (OUTPATIENT)
Dept: OCCUPATIONAL THERAPY | Facility: CLINIC | Age: 72
DRG: 467 | End: 2017-05-03
Attending: ORTHOPAEDIC SURGERY
Payer: MEDICARE

## 2017-05-03 PROBLEM — N18.9 CHRONIC KIDNEY DISEASE: Status: ACTIVE | Noted: 2017-05-03

## 2017-05-03 PROBLEM — I10 BENIGN ESSENTIAL HYPERTENSION: Status: ACTIVE | Noted: 2017-05-03

## 2017-05-03 PROBLEM — D62 ANEMIA DUE TO BLOOD LOSS, ACUTE: Status: ACTIVE | Noted: 2017-05-03

## 2017-05-03 PROBLEM — F32.A DEPRESSION: Status: ACTIVE | Noted: 2017-05-03

## 2017-05-03 LAB
ANION GAP SERPL CALCULATED.3IONS-SCNC: 9 MMOL/L (ref 3–14)
BUN SERPL-MCNC: 13 MG/DL (ref 7–30)
CALCIUM SERPL-MCNC: 8.3 MG/DL (ref 8.5–10.1)
CHLORIDE SERPL-SCNC: 107 MMOL/L (ref 94–109)
CO2 SERPL-SCNC: 22 MMOL/L (ref 20–32)
CREAT SERPL-MCNC: 1 MG/DL (ref 0.52–1.04)
GFR SERPL CREATININE-BSD FRML MDRD: 54 ML/MIN/1.7M2
GLUCOSE SERPL-MCNC: 110 MG/DL (ref 70–99)
HGB BLD-MCNC: 9.3 G/DL (ref 11.7–15.7)
POTASSIUM SERPL-SCNC: 3.9 MMOL/L (ref 3.4–5.3)
SODIUM SERPL-SCNC: 138 MMOL/L (ref 133–144)

## 2017-05-03 PROCEDURE — 97165 OT EVAL LOW COMPLEX 30 MIN: CPT | Mod: GO | Performed by: OCCUPATIONAL THERAPIST

## 2017-05-03 PROCEDURE — 97110 THERAPEUTIC EXERCISES: CPT | Mod: GP

## 2017-05-03 PROCEDURE — 40000133 ZZH STATISTIC OT WARD VISIT: Performed by: OCCUPATIONAL THERAPIST

## 2017-05-03 PROCEDURE — 25000128 H RX IP 250 OP 636: Performed by: ORTHOPAEDIC SURGERY

## 2017-05-03 PROCEDURE — 97530 THERAPEUTIC ACTIVITIES: CPT | Mod: GP

## 2017-05-03 PROCEDURE — 97530 THERAPEUTIC ACTIVITIES: CPT | Mod: GO | Performed by: OCCUPATIONAL THERAPIST

## 2017-05-03 PROCEDURE — 40000193 ZZH STATISTIC PT WARD VISIT

## 2017-05-03 PROCEDURE — 12000007 ZZH R&B INTERMEDIATE

## 2017-05-03 PROCEDURE — 97535 SELF CARE MNGMENT TRAINING: CPT | Mod: GO | Performed by: OCCUPATIONAL THERAPIST

## 2017-05-03 PROCEDURE — 85018 HEMOGLOBIN: CPT | Performed by: ORTHOPAEDIC SURGERY

## 2017-05-03 PROCEDURE — 25000132 ZZH RX MED GY IP 250 OP 250 PS 637: Mod: GY | Performed by: ORTHOPAEDIC SURGERY

## 2017-05-03 PROCEDURE — 36415 COLL VENOUS BLD VENIPUNCTURE: CPT | Performed by: ORTHOPAEDIC SURGERY

## 2017-05-03 PROCEDURE — 97116 GAIT TRAINING THERAPY: CPT | Mod: GP

## 2017-05-03 PROCEDURE — A9270 NON-COVERED ITEM OR SERVICE: HCPCS | Mod: GY | Performed by: ORTHOPAEDIC SURGERY

## 2017-05-03 PROCEDURE — 80048 BASIC METABOLIC PNL TOTAL CA: CPT | Performed by: ORTHOPAEDIC SURGERY

## 2017-05-03 RX ORDER — OXYCODONE HYDROCHLORIDE 5 MG/1
5-10 TABLET ORAL
Qty: 60 TABLET | Refills: 0 | Status: ON HOLD | DISCHARGE
Start: 2017-05-03 | End: 2019-12-16

## 2017-05-03 RX ADMIN — ACETAMINOPHEN 975 MG: 325 TABLET, FILM COATED ORAL at 06:17

## 2017-05-03 RX ADMIN — ENOXAPARIN SODIUM 40 MG: 40 INJECTION SUBCUTANEOUS at 09:39

## 2017-05-03 RX ADMIN — OXYCODONE HYDROCHLORIDE 10 MG: 5 TABLET ORAL at 12:55

## 2017-05-03 RX ADMIN — ACETAMINOPHEN 975 MG: 325 TABLET, FILM COATED ORAL at 21:22

## 2017-05-03 RX ADMIN — PSYLLIUM HUSK 0.52 G: 20 CAPSULE ORAL at 13:58

## 2017-05-03 RX ADMIN — LEVOTHYROXINE SODIUM 75 MCG: 75 TABLET ORAL at 06:58

## 2017-05-03 RX ADMIN — ACETAMINOPHEN 975 MG: 325 TABLET, FILM COATED ORAL at 11:57

## 2017-05-03 RX ADMIN — OXYCODONE HYDROCHLORIDE 10 MG: 5 TABLET ORAL at 06:58

## 2017-05-03 RX ADMIN — ASPIRIN 81 MG 162 MG: 81 TABLET ORAL at 17:13

## 2017-05-03 RX ADMIN — ATORVASTATIN CALCIUM 80 MG: 80 TABLET, FILM COATED ORAL at 11:57

## 2017-05-03 RX ADMIN — TRIAMTERENE AND HYDROCHLOROTHIAZIDE 1 TABLET: 37.5; 25 TABLET ORAL at 17:13

## 2017-05-03 RX ADMIN — PSYLLIUM HUSK 0.52 G: 20 CAPSULE ORAL at 09:38

## 2017-05-03 RX ADMIN — ALLOPURINOL 100 MG: 100 TABLET ORAL at 11:57

## 2017-05-03 RX ADMIN — POTASSIUM CHLORIDE 10 MEQ: 750 TABLET, EXTENDED RELEASE ORAL at 11:57

## 2017-05-03 RX ADMIN — OXYCODONE HYDROCHLORIDE 10 MG: 5 TABLET ORAL at 17:13

## 2017-05-03 RX ADMIN — OXYCODONE HYDROCHLORIDE 10 MG: 5 TABLET ORAL at 21:22

## 2017-05-03 NOTE — PLAN OF CARE
"Problem: Goal Outcome Summary  Goal: Goal Outcome Summary  Surgeon Discharge Plan: \"skilled nursing facility tomorrow\"      Current Functional Status: Pt requires CGA and cues for maintaining hip precautions during transfers. Pt ambulates 30ft today with fww and CGA. Pt demonstrates unsteadiness during gait and requires frequent verbal cues during gait to maintain hip precautions due to tendency to internally rotate on R LE during gait and attempts to pivot on R LE when turning. Pt participates in supine strengthening exercises. PT continuing to recommend TCU due to above listed safety concerns.      Barriers to Plan/Home: TCU-none                "

## 2017-05-03 NOTE — PLAN OF CARE
"Problem: Goal Outcome Summary  Goal: Goal Outcome Summary  Surgeon Discharge Plan: \"Home Thursday\"     Current Functional Status: Pt is transferring supine to sit with SBA and sit to stand with CGA. Pt continues to be limited due to dizziness upon standing and unable to stand greater than 20 sec today as a result and consequently unable to ambulate. BP at 121/64. Pt is tolerating supine exercises well. PT recommending TCU at this time as pt unable to ambulate or transfer without assistance.      Barriers to Plan/Home: Stairs, unable to ambulate yet, continues to be limitied to dizziness despite stable BP, needing help with mobility.                 "

## 2017-05-03 NOTE — PROGRESS NOTES
05/03/17 0942   Quick Adds   Type of Visit Initial Occupational Therapy Evaluation   Living Environment   Lives With child(eder), adult  (daughter)   Living Arrangements house  (split level)   Home Accessibility stairs within home;tub/shower is not walk in   Number of Stairs to Enter Home 0   Number of Stairs Within Home 12  (2x6 to bedroom/bathroom, 1x6 to basement)   Stair Railings at Home inside, present on right side;inside, present on left side   Transportation Available car;family or friend will provide  (pt drives at baseline)   Self-Care   Dominant Hand right   Usual Activity Tolerance moderate   Current Activity Tolerance poor   Regular Exercise no   Equipment Currently Used at Home shower chair   Activity/Exercise/Self-Care Comment Pain has been preventing exercise   Functional Level Prior   Ambulation 0-->independent   Transferring 0-->independent   Toileting 0-->independent   Bathing 1-->assistive equipment  (uses shower chair)   Dressing 0-->independent   Eating 0-->independent   Communication 0-->understands/communicates without difficulty   Swallowing 0-->swallows foods/liquids without difficulty   Cognition 0 - no cognition issues reported   Fall history within last six months no   Which of the above functional risks had a recent onset or change? ambulation;transferring;toileting;bathing;dressing   General Information   Onset of Illness/Injury or Date of Surgery - Date 05/01/17   Referring Physician Dr. Kramer   Patient/Family Goals Statement To go home   Additional Occupational Profile Info/Pertinent History of Current Problem Pt is a 70 y/o female POD 0 R JAIR/revision d/t failed hardware, pt with B hip replacements completed >10 years ago, L hip revision completed in 2011. Pt is WBAT, posterior/ posterior lateral hip precautions. At baseline pt reports IND with all functional mobility and activities, lives in split level home with adult daughter, no stairs to enter main level, 6 stairs/1 rail to  access basement and 2x6 stairs/1 rail to access bedroom and bathroom.    Precautions/Limitations fall precautions;right hip precautions   Weight-Bearing Status - RLE weight-bearing as tolerated   Cognitive Status Examination   Orientation orientation to person, place and time   Cognitive Comment No concern at this time   Visual Perception   Visual Perception No deficits were identified   Sensory Examination   Sensory Quick Adds No deficits were identified   Pain Assessment   Patient Currently in Pain Yes, see Vital Sign flowsheet   Integumentary/Edema   Integumentary/Edema no deficits were identifed   Posture   Posture not impaired   Strength   Manual Muscle Testing Quick Adds No deficits were identified   Strength Comments BUE   Hand Strength   Hand Strength Comments WFL   Coordination   Coordination Comments WFL   Mobility   Bed Mobility Comments MIN A   Transfer Skills   Transfer Comments CGA-ACACIA   Transfer Skill: Bed to Chair/Chair to Bed   Level of [place]: Bed to Chair minimum assist (75% patients effort)   Weight-Bearing Restrictions weight-bearing as tolerated   Assistive Device - Transfer Skill Bed to Chair Chair to Bed Rehab Eval rolling walker   Transfer Skill: Sit to Stand   Level of [place]: Sit/Stand contact guard   Transfer Skill: Sit to Stand weight-bearing as tolerated   Assistive Device for Transfer: Sit/Stand rolling walker   Toilet Transfer   Toilet Transfer Comments Bed side commode   Transfer Skill: Toilet Transfer   Toilet Transfer Skill Comments MIN A   Tub/Shower Transfer   Tub/Shower Transfer Comments MODA   Balance   Balance Comments Impaired   Bathing   Assistive Device shower chair;long handled sponge   Upper Body Dressing   Level of [place]: Dress Upper Body independent   Lower Body Dressing   Level of [place]: Dress Lower Body stand-by assist   Assistive Device dressing stick;long-handled shoe horn;reacher;sock-aid   Toileting   Level of [place]: Toilet  "minimum assist (75% patients effort)   Assistive Device raised toilet seat;rolling walker   Grooming   Level of [place]: Grooming independent   Eating/Self Feeding   Level of [place]: Eating independent   Activities of Daily Living Analysis   Impairments Contributing to Impaired Activities of Daily Living balance impaired;post surgical precautions  (Lightheadedness)   ADL Comments Pt reports she was I in ADLs prior to surgery   General Therapy Interventions   Planned Therapy Interventions ADL retraining   Intervention Comments LE dressing, safety and discharge planning   Clinical Impression   Criteria for Skilled Therapeutic Interventions Met yes, treatment indicated   OT Diagnosis Reduced I in ADLs   Influenced by the following impairments Light headedness, reduced activity tolerance, post surgical precauitons   Assessment of Occupational Performance 1-3 Performance Deficits   Identified Performance Deficits Light headedness, reduced activity tolerance, post surgical precauitons   Clinical Decision Making (Complexity) Low complexity   Therapy Frequency other (see comments)   Predicted Duration of Therapy Intervention (days/wks) 1 time   Anticipated Equipment Needs at Discharge bathing equipment;reacher;shower chair;dressing equipment   Anticipated Discharge Disposition Transitional Care Facility   Risks and Benefits of Treatment have been explained. Yes   Patient, Family & other staff in agreement with plan of care Yes   Clinical Impression Comments Pt presents with reduced I in ADLs, due to reduced activity tolerance, reduced functional mobility, post surgical precautions and light headedness   [facility] AM-PAC TM \"6 Clicks\"   2016, Trustees of [facility], under license to [employer].  All rights reserved.   6 Clicks Short Forms Basic Mobility Inpatient Short Form;Daily Activity Inpatient Short Form   [facility] AM-PAC  \"6 Clicks\" Daily Activity Inpatient Short Form   1. Putting " on and taking off regular lower body clothing? 3 - A Little   2. Bathing (including washing, rinsing, drying)? 3 - A Little   3. Toileting, which includes using toilet, bedpan or urinal? 3 - A Little   4. Putting on and taking off regular upper body clothing? 4 - None   5. Taking care of personal grooming such as brushing teeth? 4 - None   6. Eating meals? 4 - None   Daily Activity Raw Score (Score out of 24.Lower scores equate to lower levels of function) 21   Total Evaluation Time   Total Evaluation Time (Minutes) 10

## 2017-05-03 NOTE — PLAN OF CARE
Problem: Goal Outcome Summary  Goal: Goal Outcome Summary  Outcome: Improving  VSS, A&Ox4, CMS intact. New dressing applied. Up with one to BSC and voiding adequately. Pain controlled with oxycodone. Pt progressing per plan of care, will continue to monitor.

## 2017-05-03 NOTE — DISCHARGE SUMMARY
Discharge Summary    Stormy Bowman MRN# [id number]   YOB: 1945 Age: 71 year old     Date of Admission:  5/1/2017  Date of Discharge:  5/4/2017  Admitting Physician:  Wilman Kramer MD  Discharge Physician:  Wilman Kramer MD     Primary Provider: Oscar Munoz          Admission Diagnoses:    right failed total hip replacement          Discharge Diagnosis:   Same           Surgical Procedure:   right revision hip replacement           Secondary Diagnosis:     Patient Active Problem List   Diagnosis     LOC (loss of consciousness) (H)     Hip joint replacement status     Benign essential hypertension     Anemia due to blood loss, acute     Chronic kidney disease     Depression              Discharge Disposition:   Discharged to home           Medications Prior to Admission:     Prescriptions Prior to Admission   Medication Sig Dispense Refill Last Dose     ZOLPIDEM TARTRATE PO Take 5-10 mg by mouth nightly as needed for sleep (patient takes 0.5 -1 X 10 mg = 5 to 10 mg dose)   Over 1 month ago at prn     ATORVASTATIN CALCIUM PO Take 80 mg by mouth daily (with lunch) (at noon)   4/30/2017 at 1200     Probiotic Product (ALIGN PO) Take 4 mg by mouth daily (with lunch) (at noon)   4/30/2017 at 1200     Psyllium (WAL-MUCIL PO) Take 1 capsule by mouth 2 times daily (at 10:00 and 14:00)   4/30/2017 at 1000     allopurinol (ZYLOPRIM) 100 MG tablet Take 100 mg by mouth daily (with lunch) (at noon)   4/30/2017 at 1200     triamterene-hydrochlorothiazide (DYAZIDE) 37.5-25 MG per capsule Take 1 capsule by mouth daily (with dinner)    4/30/2017 at 1800     Potassium Chloride Elli CR (K-DUR PO) Take 10 mEq by mouth daily (with lunch) (at noon)   4/30/2017 at 1200     Cholecalciferol (VITAMIN D3 PO) Take 2,000 Units by mouth daily (with dinner)    4/30/2017 at 1800     ASPIRIN PO Take 162 mg by mouth daily (with dinner) (Takes 2 x 81mg tablet = 162mg)   Over 1 week ago at  1800      Levothyroxine Sodium (SYNTHROID PO) Take 75 mcg by mouth every morning    5/1/2017 at 0500     DiphenhydrAMINE HCl (BENADRYL DYE-FREE ALLERGY PO) Take 50 mg by mouth At Bedtime (Takes x 25mg tablet = 50mg dose)   4/29/2017 at 2200     [DISCONTINUED] Celecoxib (CELEBREX PO) Take 200 mg by mouth every other day (at noon)   Over 2 weeks ago at 12:00             Discharge Medications:     Current Discharge Medication List      START taking these medications    Details   oxyCODONE (ROXICODONE) 5 MG IR tablet Take 1-2 tablets (5-10 mg) by mouth every 3 hours as needed for moderate to severe pain  Qty: 60 tablet, Refills: 0    Associated Diagnoses: Hip joint replacement status      enoxaparin (LOVENOX) 40 MG/0.4ML injection Inject 0.4 mLs (40 mg) Subcutaneous every 24 hours for 14 days    Associated Diagnoses: Hip joint replacement status         CONTINUE these medications which have NOT CHANGED    Details   ZOLPIDEM TARTRATE PO Take 5-10 mg by mouth nightly as needed for sleep (patient takes 0.5 -1 X 10 mg = 5 to 10 mg dose)      ATORVASTATIN CALCIUM PO Take 80 mg by mouth daily (with lunch) (at noon)      Probiotic Product (ALIGN PO) Take 4 mg by mouth daily (with lunch) (at noon)      Psyllium (WAL-MUCIL PO) Take 1 capsule by mouth 2 times daily (at 10:00 and 14:00)      allopurinol (ZYLOPRIM) 100 MG tablet Take 100 mg by mouth daily (with lunch) (at noon)      triamterene-hydrochlorothiazide (DYAZIDE) 37.5-25 MG per capsule Take 1 capsule by mouth daily (with dinner)       Potassium Chloride Elli CR (K-DUR PO) Take 10 mEq by mouth daily (with lunch) (at noon)      Cholecalciferol (VITAMIN D3 PO) Take 2,000 Units by mouth daily (with dinner)       ASPIRIN PO Take 162 mg by mouth daily (with dinner) (Takes 2 x 81mg tablet = 162mg)      Levothyroxine Sodium (SYNTHROID PO) Take 75 mcg by mouth every morning       DiphenhydrAMINE HCl (BENADRYL DYE-FREE ALLERGY PO) Take 50 mg by mouth At Bedtime (Takes x 25mg tablet = 50mg  dose)         STOP taking these medications       Celecoxib (CELEBREX PO) Comments:   Reason for Stopping:                     Consultations:   No consultations were requested during this admission           Hospital Course:   The patient was admitted after the surgical procedure. The patient underwent an uneventful revision right hip replacement. Postoperatively, anticoagulation  with Lovenox was started. No transfusion was required. The patient will be discharged to home with her family. Home medications have been reconciled. oxycodone 5 mg. was prescribed for pain. Lovenox  will be prescribed.             Pending Results:   None           Discharge Instructions and Follow-Up:        Discharge activity: use a walker   Discharge follow-up: Follow up with me in 2 weeks   Outpatient therapy: None    Home Care agency: None    Supplies and equipment: None        Wound care: dry dressing daily and as needed   Other instructions: None

## 2017-05-03 NOTE — PROGRESS NOTES
Stormy Bowman  5/3/2017  POD #2    Doing well.  Pain well-controlled.    Thus far, she is not tolerating PT very well. She is only able to stand briefly.    Temperatures:  Current - Temp: 98.5  F (36.9  C); Max - Temp  Av.2  F (36.8  C)  Min: 98.1  F (36.7  C)  Max: 98.5  F (36.9  C)  Pulse range: No Data Recorded  Blood pressure range: Systolic (24hrs), Av , Min:98 , Max:126   ; Diastolic (24hrs), Av, Min:55, Max:77    CMS: intact  Labs:  Hemoglobin   Date Value Ref Range Status   2017 9.3 (L) 11.7 - 15.7 g/dL Final   ]      PLAN:  Continue physical therapy  Discharge plan: Skilled Nursing Facility tomorrow

## 2017-05-03 NOTE — PLAN OF CARE
Problem: Goal Outcome Summary  Goal: Goal Outcome Summary  OT: Physician orders received, eval completed, treatment initiated. 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 Pt is WBAT, posterior/ posterior lateral hip precautions. At baseline pt reports IND with all functional mobility and activities, lives in split level home with adult daughter, no stairs to enter main level, 6 stairs/1 rail to access basement and 2x6 stairs/1 rail to access bedroom and bathroom.            Surgeon Discharge Plan: Non specifically stated     Current Functional Status: Pt completed transfers with MIN A, denies dizziness during standing, pt able to ambulate a few feet, stood for 2 min, with CGA. Pt completed bed mobility with MIN A. Pt participated in ed regarding LE dressing recommendations, pt demonstrates understanding and has equipment at home, pt able to verbalize understanding of precautions. Pt participated in ed regarding discharge recommendations including recommendation to TCU for safety of patient and caregivers, pt verbalized understanding, but states she would strongly prefer discharge to home. Pending improvements in dizziness recommend discharge to TCU.           Barriers to Plan/Home: Lightheadedness, decreased standing and activity tolerance, stairs

## 2017-05-03 NOTE — PROGRESS NOTES
"Care Transition Initial Assessment - SW  Reason For Consult: discharge planning  Met with: Patient  Active Problems:    Hip joint replacement status    Benign essential hypertension    Anemia due to blood loss, acute    Chronic kidney disease    Depression         DATA  Lives With: child(eder), adult  Living Arrangements: house  Description of Support System: Supportive, Involved  Who is your support system?: Children  Support Assessment: Adequate family and caregiver support, Adequate social supports.   Identified issues/concerns regarding health management:         Quality Of Family Relationships: supportive, involved  Transportation Available: TBD      ASSESSMENT  Cognitive Status:  Awake, alert and oriented X3.  Concerns to be addressed:     Per automatic referral, SW met with patient to discuss discharge planning needs.  Patient is a 71-year-old female who was admitted to the hospital on 5-1-17 for an elective right hip revision.  Prior to hospitalization, patient was living at home with her adult daughter where they were managing well.  Patient was planning on going home upon discharge but now feels that having a back up plan for TCU would be a good idea.  Per the MD note today, \"skilled nursing facility tomorrow.\"  Patient expressed interest in [facility].  Patient would like a private room at the facility and is aware of the $36 per day private room fee that is not covered under her insurance.  SW made a referral to the facility via [facility] to have them assess patient for a possible admission for tomorrow.  SW will follow up regarding transportation once the discharge plan has been finalized.     PLAN  Financial costs for the patient includes: Discussed the $36 daily private room fee that is not covered under the patients insurance.  Patient given options and choices for discharge: TCU facility list offered.  Patient/family is agreeable to the plan?  Yes  Patient Goals and Preferences: " TCU at discharge.  Patient anticipates discharging to:  TCU at discharge.    RON Glass      STEVEN  D: STEVEN following for discharge planning needs.  STEVEN received a message from Leydi in Admissions at [facility] [address] stating that they will have a bed available for patient tomorrow.  I: STEVEN updated patient on the above and she still prefers to go home upon discharge and requested that SW follow up tomorrow regarding what her plans are.  A: Patient is alert and oriented X3.  P: SW will continue to follow and assist with finalizing the discharge plan as appropriate.    RON Glass

## 2017-05-03 NOTE — PLAN OF CARE
Problem: Goal Outcome Summary  Goal: Goal Outcome Summary  Outcome: Improving  Pt A&O x4. VSS, afebrile. CMS intact.  Drsg is C/D/I. Pain managed by oxycodone. Benadryl administered for itchiness. IV SL d/t infiltration. Voiding adequately on the BSC. Progressing well per plan of care.

## 2017-05-04 ENCOUNTER — APPOINTMENT (OUTPATIENT)
Dept: PHYSICAL THERAPY | Facility: CLINIC | Age: 72
DRG: 467 | End: 2017-05-04
Attending: ORTHOPAEDIC SURGERY
Payer: MEDICARE

## 2017-05-04 VITALS
TEMPERATURE: 98.2 F | SYSTOLIC BLOOD PRESSURE: 107 MMHG | BODY MASS INDEX: 31.25 KG/M2 | HEIGHT: 63 IN | WEIGHT: 176.37 LBS | OXYGEN SATURATION: 93 % | HEART RATE: 82 BPM | RESPIRATION RATE: 16 BRPM | DIASTOLIC BLOOD PRESSURE: 63 MMHG

## 2017-05-04 LAB
CREAT SERPL-MCNC: 1.02 MG/DL (ref 0.52–1.04)
GFR SERPL CREATININE-BSD FRML MDRD: 53 ML/MIN/1.7M2
PLATELET # BLD AUTO: 167 10E9/L (ref 150–450)

## 2017-05-04 PROCEDURE — 85049 AUTOMATED PLATELET COUNT: CPT | Performed by: ORTHOPAEDIC SURGERY

## 2017-05-04 PROCEDURE — 25000128 H RX IP 250 OP 636: Performed by: ORTHOPAEDIC SURGERY

## 2017-05-04 PROCEDURE — A9270 NON-COVERED ITEM OR SERVICE: HCPCS | Mod: GY | Performed by: ORTHOPAEDIC SURGERY

## 2017-05-04 PROCEDURE — 36415 COLL VENOUS BLD VENIPUNCTURE: CPT | Performed by: ORTHOPAEDIC SURGERY

## 2017-05-04 PROCEDURE — 40000193 ZZH STATISTIC PT WARD VISIT

## 2017-05-04 PROCEDURE — 97110 THERAPEUTIC EXERCISES: CPT | Mod: GP

## 2017-05-04 PROCEDURE — 82565 ASSAY OF CREATININE: CPT | Performed by: ORTHOPAEDIC SURGERY

## 2017-05-04 PROCEDURE — 25000132 ZZH RX MED GY IP 250 OP 250 PS 637: Mod: GY | Performed by: ORTHOPAEDIC SURGERY

## 2017-05-04 PROCEDURE — 97116 GAIT TRAINING THERAPY: CPT | Mod: GP

## 2017-05-04 PROCEDURE — 3E0U33Z INTRODUCTION OF ANTI-INFLAMMATORY INTO JOINTS, PERCUTANEOUS APPROACH: ICD-10-PCS | Performed by: ORTHOPAEDIC SURGERY

## 2017-05-04 RX ADMIN — PSYLLIUM HUSK 0.52 G: 20 CAPSULE ORAL at 09:26

## 2017-05-04 RX ADMIN — ENOXAPARIN SODIUM 40 MG: 40 INJECTION SUBCUTANEOUS at 08:25

## 2017-05-04 RX ADMIN — OXYCODONE HYDROCHLORIDE 10 MG: 5 TABLET ORAL at 09:26

## 2017-05-04 RX ADMIN — ACETAMINOPHEN 975 MG: 325 TABLET, FILM COATED ORAL at 05:48

## 2017-05-04 RX ADMIN — LEVOTHYROXINE SODIUM 75 MCG: 75 TABLET ORAL at 07:47

## 2017-05-04 RX ADMIN — OXYCODONE HYDROCHLORIDE 10 MG: 5 TABLET ORAL at 06:17

## 2017-05-04 NOTE — PLAN OF CARE
Problem: Goal Outcome Summary  Goal: Goal Outcome Summary  Outcome: No Change  Progressing per plan of care, discharging to TCU today, takes oxycodone for pain.

## 2017-05-04 NOTE — PROGRESS NOTES
Stormy Bowman  2017  POD #3    I personally observed the patient get out of bed, ambulate across the room with her walker, and get back in bed without assistance. Her nurse informed me she did this overnight as well.    Doing well.  Pain well-controlled.  Tolerating physical therapy and rehabilitation well.  Temperatures:  Current - Temp: 98.9  F (37.2  C); Max - Temp  Av.4  F (36.9  C)  Min: 98.1  F (36.7  C)  Max: 98.9  F (37.2  C)  Pulse range: No Data Recorded  Blood pressure range: Systolic (24hrs), Av , Min:106 , Max:121   ; Diastolic (24hrs), Av, Min:53, Max:68    CMS: intact  Labs:nolrmal BMP    PLAN:  Discharge plan: home with family.

## 2017-05-04 NOTE — PLAN OF CARE
Problem: Goal Outcome Summary  Goal: Goal Outcome Summary  Outcome: Completed Date Met:  05/04/17  A&Ox4. VSS. Pain managed with oxycodone and ice pack. CMS intact. Dressing changed; CDI. Tolerating diet. Voiding adequately. Up w/1+ walker/gait. Patient is adequate for discharge to home today.

## 2017-05-04 NOTE — PROGRESS NOTES
5/4/2017    Stormy Ann Colby    Diagnosis: Right hip bursitis    Procedure: Steroid injection right hip bursa    Surgeon: Dr. Wilman Kramer    Description of procedure: The patient was placed in left lateral decubitus position and the right hip bursa area was prepped with ChloraPrep.80 mg of Kenalog and 8 mL of 1% Xylocaine were injected bursa The patient tolerated procedure well.

## 2017-05-04 NOTE — PLAN OF CARE
Problem: Goal Outcome Summary  Goal: Goal Outcome Summary  Outcome: No Change  Pain controlled w/ PO pain meds. Tolerating diet. Up w/ assist of 1, walker and gait belt. Voiding in the bathroom. Plan to DC to TCU tomorrow. Continue to monitor.

## 2017-05-04 NOTE — PROGRESS NOTES
STEVEN  D: Per MD order, patient okay to discharge to home.  I: STEVEN spoke to Leydi in Admissions at [facility] [address] to update her that patient will be going home now and will no longer need a bed in TCU there.  Patients family to transport patient home today.  A: Patient is alert and oriented X3.  P: Patient to discharge home today with family assist.  SW will be available to assist as needed.    Marnie Peck, RON

## 2017-05-04 NOTE — PLAN OF CARE
"Problem: Goal Outcome Summary  Goal: Goal Outcome Summary  Surgeon Discharge Plan: \"home with family\"     Current Functional Status: Pt demonstrating independence with supine to/from sit and SBA for sit to/from stand transfers. Pt ambulates 100ft with fww and SBA. Pt ascends/descends 8 steps with L railing and requires instruction for proper technique and CGA for safety. Educated pt on having family member with when performing steps into her home. Pt instructed in JAIR HEP and demonstrating independence. Pt has met supine to sit transfers and has not met independence goals for sit to/from stand, gait or stairs. Pt able to list all hip precautions.      Barriers to Plan/Home: Stairs, need for assistance with stairs and ambulation. Pt will have family assistance.      Physical Therapy Discharge Summary     Reason for therapy discharge:    Discharged to home.     Progress towards therapy goal(s). See goals on Care Plan in [facility] electronic health record for goal details.  Goals partially met.  Barriers to achieving goals:   limited tolerance for therapy.     Therapy recommendation(s):  Continue HEP.                       "

## 2018-12-06 ENCOUNTER — TRANSFERRED RECORDS (OUTPATIENT)
Dept: HEALTH INFORMATION MANAGEMENT | Facility: CLINIC | Age: 73
End: 2018-12-06

## 2019-11-14 NOTE — PROGRESS NOTES
SUBJECTIVE:                                                   Stormy Bowman is a 74 year old female who presents to clinic today for the following health issue(s):  Patient presents with:  Vaginal Bleeding: has had 3 episodes of vaginal bleeding in the last month.      HPI: The patient is seen at this time for recurrent postmenopausal bleeding.  She had a polyp with hysteroscopy and D&C in both  and .  Her pathology was benign.  She is on no replacement.  She has been feeling some vaginal pressure with prolapse.      No LMP recorded. Patient is postmenopausal..     Patient is not sexually active, .  Using not sexually active for contraception.    reports that she has never smoked. She has never used smokeless tobacco.    STD testing offered?  Declined    Health maintenance updated:  yes    Today's PHQ-2 Score:   PHQ-2 (  [employer]) 2019   Q1: Little interest or pleasure in doing things 2   Q2: Feeling down, depressed or hopeless 2   PHQ-2 Score 4     Today's PHQ-9 Score:   PHQ-9 SCORE 2019   PHQ-9 Total Score 8     Today's HARMONY-7 Score:   HARMONY-7 SCORE 2017   Total Score 6       Problem list and histories reviewed & adjusted, as indicated.  Additional history: as documented.    Patient Active Problem List   Diagnosis     LOC (loss of consciousness) (H)     Hip joint replacement status     Benign essential hypertension     Anemia due to blood loss, acute     Chronic kidney disease     Depression     Past Surgical History:   Procedure Laterality Date     ABDOMEN SURGERY      abdominalplasty     ARTHROPLASTY REVISION HIP Right 2017    Procedure: ARTHROPLASTY REVISION HIP;  REVISION RIGHT TOTAL HIP ARTHROPLASTY ;  Surgeon: Wilman Kramer MD;  Location: [facility] OR     CHOLECYSTECTOMY       GYN SURGERY      D&C     HYSTEROSCOPY, ABLATE ENDOMETRIUM VERSAPOINT , COMBINED N/A 2017    Procedure: COMBINED HYSTEROSCOPY, ABLATE ENDOMETRIUM VERSAPOINT;  CERVICAL DILITATION FOR  COMPLETE CERVICAL STENOSIS, HYSTEROSCOPY, POLYPECTOMY, ENDOMETRIAL ABLATION WITH [employer]ogen SystemsPOINT ;  Surgeon: Wilman Méndez MD;  Location: [facility]     JOINT REPLACEMENT       ORTHOPEDIC SURGERY      hip replacements X2,      Social History     Tobacco Use     Smoking status: Never Smoker     Smokeless tobacco: Never Used   Substance Use Topics     Alcohol use: No      Problem (# of Occurrences) Relation (Name,Age of Onset)    Cancer (1) Paternal Grandmother    Colon Cancer (1) Mother    Heart Disease (1) Father    Hyperlipidemia (2) Mother, Father    Hypertension (2) Mother, Father            Current Outpatient Medications   Medication Sig     allopurinol (ZYLOPRIM) 100 MG tablet Take 100 mg by mouth daily (with lunch) (at noon)     ASPIRIN PO Take 162 mg by mouth daily (with dinner) (Takes 2 x 81mg tablet = 162mg)     ATORVASTATIN CALCIUM PO Take 80 mg by mouth daily (with lunch) (at noon)     celecoxib (CELEBREX) 200 MG capsule Take 200 mg by mouth     Cholecalciferol (VITAMIN D3 PO) Take 2,000 Units by mouth daily (with dinner)      cyanocobalamin (CYANOCOBALAMIN) 1000 MCG/ML injection Inject 1,000 mcg into the muscle     DiphenhydrAMINE HCl (BENADRYL DYE-FREE ALLERGY PO) Take 50 mg by mouth At Bedtime (Takes x 25mg tablet = 50mg dose)     Levothyroxine Sodium (SYNTHROID PO) Take 75 mcg by mouth every morning      oxyCODONE (ROXICODONE) 5 MG IR tablet Take 1-2 tablets (5-10 mg) by mouth every 3 hours as needed for moderate to severe pain     Potassium Chloride Elli CR (K-DUR PO) Take 10 mEq by mouth daily (with lunch) (at noon)     Probiotic Product (ALIGN PO) Take 4 mg by mouth daily (with lunch) (at noon)     Psyllium (WAL-MUCIL PO) Take 1 capsule by mouth 2 times daily (at 10:00 and 14:00)     triamterene-hydrochlorothiazide (DYAZIDE) 37.5-25 MG per capsule Take 1 capsule by mouth daily (with dinner)      ZOLPIDEM TARTRATE PO Take 5-10 mg by mouth nightly as needed for sleep (patient takes 0.5 -1 X 10 mg = 5 to 10  "mg dose)     No current facility-administered medications for this visit.      Allergies   Allergen Reactions     Erythromycin GI Disturbance     Penicillins Unknown     Dilaudid [Hydromorphone] Nausea and Vomiting     Can take Norco and Percocet     Pollen Extract        ROS:  Genitourinary: vaginal bleeding  Musculoskeletal: Joint Pain  12 point review of systems negative other than symptoms noted below.    OBJECTIVE:     /74   Pulse 76   Ht 1.588 m (5' 2.5\")   Wt 74.8 kg (165 lb)   BMI 29.70 kg/m    Body mass index is 29.7 kg/m .    Exam:  Constitutional:  Appearance: Well nourished, well developed alert, in no acute distress  Gastrointestinal:  Abdominal Examination:  Abdomen nontender to palpation, tone normal without rigidity or guarding, no masses present, umbilicus without lesions; Liver/Spleen:  No hepatomegaly present, liver nontender to palpation; Hernias:  No hernias present  Lymphatic: Lymph Nodes:  No other lymphadenopathy present  Skin: General Inspection:  No rashes present, no lesions present, no areas of discoloration.  Neurologic:  Mental Status:  Oriented X3.  Normal strength and tone, sensory exam grossly normal, mentation intact and speech normal.    Psychiatric:  Mentation appears normal and affect normal/bright.  Pelvic Exam:  External Genitalia:     Normal appearance for age, no discharge present, no tenderness present, no inflammatory lesions present, color normal  Vagina:     Normal vaginal vault without central or paravaginal defects, ATROPHIC  Bladder:     Nontender to palpation  Urethra:   Urethral Body:  Urethra palpation normal, urethra structural support normal   Urethral Meatus:  No erythema or lesions present  Cervix: Second-degree cervical prolapse, no sign of polyp   Appearance healthy, no lesions present, nontender to palpation, no bleeding present  Uterus:     Nontender to palpation, no masses present, position anteflexed, mobility: normal  Adnexa:     No adnexal " tenderness present, no adnexal masses present  Perineum:     Perineum within normal limits, no evidence of trauma, no rashes or skin lesions present  Inguinal Lymph Nodes:     No lymphadenopathy present       In-Clinic Test Results:      ASSESSMENT/PLAN:                                                      Recent with her third episode of postmenopausal bleeding.  We will perform a pelvic ultrasound and determine next steps.  She may need a frozen section D&C but with her second-degree uterine prolapse she definitely needs removal of the uterus tubes and ovaries and repairs before there is any further loss of support.          Wilman Méndez MD  [facility]

## 2019-11-18 ENCOUNTER — OFFICE VISIT (OUTPATIENT)
Dept: OBGYN | Facility: CLINIC | Age: 74
End: 2019-11-18
Payer: COMMERCIAL

## 2019-11-18 VITALS
DIASTOLIC BLOOD PRESSURE: 74 MMHG | HEIGHT: 63 IN | HEART RATE: 76 BPM | SYSTOLIC BLOOD PRESSURE: 118 MMHG | BODY MASS INDEX: 29.23 KG/M2 | WEIGHT: 165 LBS

## 2019-11-18 DIAGNOSIS — N95.0 PMB (POSTMENOPAUSAL BLEEDING): Primary | ICD-10-CM

## 2019-11-18 PROCEDURE — 99214 OFFICE O/P EST MOD 30 MIN: CPT | Performed by: OBSTETRICS & GYNECOLOGY

## 2019-11-18 RX ORDER — CYANOCOBALAMIN 1000 UG/ML
1000 INJECTION, SOLUTION INTRAMUSCULAR; SUBCUTANEOUS
COMMUNITY
Start: 2019-11-05 | End: 2020-12-16

## 2019-11-18 RX ORDER — CELECOXIB 200 MG/1
200 CAPSULE ORAL
COMMUNITY
Start: 2019-10-28

## 2019-11-18 ASSESSMENT — MIFFLIN-ST. JEOR: SCORE: 1204.63

## 2019-11-18 ASSESSMENT — PATIENT HEALTH QUESTIONNAIRE - PHQ9: SUM OF ALL RESPONSES TO PHQ QUESTIONS 1-9: 8

## 2019-11-18 NOTE — Clinical Note
Please abstract the following data from this visit with this patient into the appropriate field in Epic:Other Tests found in the patient's chart through Chart Review/Care Everywhere:Mammogram done by this group [facility] on this date: 12/6/18Note to Abstraction: If this section is blank, no results were found via Chart Review/Care Everywhere.

## 2019-11-18 NOTE — NURSING NOTE
Please abstract the following data from this visit with this patient into the appropriate field in Epic:      Other Tests found in the patient's chart through Chart Review/Care Everywhere:    Mammogram done by this group [facility] on this date: 12/6/18    Note to Abstraction: If this section is blank, no results were found via Chart Review/Care Everywhere.

## 2019-12-03 NOTE — PROGRESS NOTES
SUBJECTIVE:                                                   Stormy Bowman is a 74 year old female who presents to clinic today for the following health issue(s):  Patient presents with:  Ultrasound: recurrent postmenopausal bleeding. patient states she had one episode of bleeding since she was last seen here      HPI: The patient is a 74-year-old who is now had a third episode of postmenopausal bleeding.  She has had 2 previous D&Cs with benign pathology.  An ultrasound today shows a small area of thickening but no sign of any malignant tissue.  She has numerous medical issues including hypercholesterolemia diabetes hypertension and hypothyroidism.  She also has second-degree uterine prolapse and we have discussed definitive surgery in the past.      No LMP recorded. Patient is postmenopausal..     Patient is not sexually active, .  Using menopause for contraception.    reports that she has never smoked. She has never used smokeless tobacco.    STD testing offered?  Declined    Health maintenance updated:  yes    Today's PHQ-2 Score:   PHQ-2 (  [employer]) 2019   Q1: Little interest or pleasure in doing things 2   Q2: Feeling down, depressed or hopeless 2   PHQ-2 Score 4     Today's PHQ-9 Score:   PHQ-9 SCORE 2019   PHQ-9 Total Score 8     Today's HARMONY-7 Score:   HARMONY-7 SCORE 2017   Total Score 6       Problem list and histories reviewed & adjusted, as indicated.  Additional history: as documented.    Patient Active Problem List   Diagnosis     LOC (loss of consciousness) (H)     Hip joint replacement status     Benign essential hypertension     Anemia due to blood loss, acute     Chronic kidney disease     Depression     Past Surgical History:   Procedure Laterality Date     ABDOMEN SURGERY      abdominalplasty     ARTHROPLASTY REVISION HIP Right 2017    Procedure: ARTHROPLASTY REVISION HIP;  REVISION RIGHT TOTAL HIP ARTHROPLASTY ;  Surgeon: Wilman Kramer MD;   Location:  OR     CHOLECYSTECTOMY       GYN SURGERY      D&C     HYSTEROSCOPY, ABLATE ENDOMETRIUM VERSAPOINT , COMBINED N/A 4/28/2017    Procedure: COMBINED HYSTEROSCOPY, ABLATE ENDOMETRIUM VERSAPOINT;  CERVICAL DILITATION FOR COMPLETE CERVICAL STENOSIS, HYSTEROSCOPY, POLYPECTOMY, ENDOMETRIAL ABLATION WITH VERSAPOINT ;  Surgeon: Wilman Méndez MD;  Location:  [facility]     JOINT REPLACEMENT       ORTHOPEDIC SURGERY      hip replacements X2,      Social History     Tobacco Use     Smoking status: Never Smoker     Smokeless tobacco: Never Used   Substance Use Topics     Alcohol use: No      Problem (# of Occurrences) Relation (Name,Age of Onset)    Cancer (1) Paternal Grandmother    Colon Cancer (1) Mother    Heart Disease (1) Father    Hyperlipidemia (2) Mother, Father    Hypertension (2) Mother, Father            Current Outpatient Medications   Medication Sig     allopurinol (ZYLOPRIM) 100 MG tablet Take 100 mg by mouth daily (with lunch) (at noon)     ASPIRIN PO Take 162 mg by mouth daily (with dinner) (Takes 2 x 81mg tablet = 162mg)     ATORVASTATIN CALCIUM PO Take 80 mg by mouth daily (with lunch) (at noon)     celecoxib (CELEBREX) 200 MG capsule Take 200 mg by mouth     Cholecalciferol (VITAMIN D3 PO) Take 2,000 Units by mouth daily (with dinner)      DiphenhydrAMINE HCl (BENADRYL DYE-FREE ALLERGY PO) Take 50 mg by mouth At Bedtime (Takes x 25mg tablet = 50mg dose)     Levothyroxine Sodium (SYNTHROID PO) Take 75 mcg by mouth every morning      oxyCODONE (ROXICODONE) 5 MG IR tablet Take 1-2 tablets (5-10 mg) by mouth every 3 hours as needed for moderate to severe pain     Potassium Chloride Elli CR (K-DUR PO) Take 10 mEq by mouth daily (with lunch) (at noon)     Probiotic Product (ALIGN PO) Take 4 mg by mouth daily (with lunch) (at noon)     Psyllium (WAL-MUCIL PO) Take 1 capsule by mouth 2 times daily (at 10:00 and 14:00)     triamterene-hydrochlorothiazide (DYAZIDE) 37.5-25 MG per capsule Take 1 capsule by  "mouth daily (with dinner)      ZOLPIDEM TARTRATE PO Take 5-10 mg by mouth nightly as needed for sleep (patient takes 0.5 -1 X 10 mg = 5 to 10 mg dose)     No current facility-administered medications for this visit.      Allergies   Allergen Reactions     Erythromycin GI Disturbance     Penicillins Unknown     Dilaudid [Hydromorphone] Nausea and Vomiting     Can take Norco and Percocet     Pollen Extract        ROS:  12 point review of systems negative other than symptoms noted below or in the HPI.  No urinary frequency or dysuria, bladder or kidney problems      OBJECTIVE:     /80   Ht 1.588 m (5' 2.5\")   Wt 75.8 kg (167 lb)   BMI 30.06 kg/m    Body mass index is 30.06 kg/m .    Exam:  Constitutional:  Appearance: Well nourished, well developed alert, in no acute distress  Neck:  Lymph Nodes:  No lymphadenopathy present; Thyroid:  Gland size normal, nontender, no nodules or masses present on palpation  Chest:  Respiratory Effort:  Breathing unlabored. Clear to auscultation bilaterally.   Cardiovascular: Heart: Auscultation:  Regular rate, normal rhythm, no murmurs present  Gastrointestinal:  Abdominal Examination:  Abdomen nontender to palpation, tone normal without rigidity or guarding, no masses present, umbilicus without lesions; Liver/Spleen:  No hepatomegaly present, liver nontender to palpation; Hernias:  No hernias present  Lymphatic: Lymph Nodes:  No other lymphadenopathy present  Skin: General Inspection:  No rashes present, no lesions present, no areas of discoloration.  Neurologic:  Mental Status:  Oriented X3.  Normal strength and tone, sensory exam grossly normal, mentation intact and speech normal.    Psychiatric:  Mentation appears normal and affect normal/bright.  Pelvic Exam:  External Genitalia:     Normal appearance for age, no discharge present, no tenderness present, no inflammatory lesions present, color normal  Vagina: Cystocele and rectocele  , ATROPHIC  Bladder:     Nontender to " palpation  Urethra:   Urethral Body:  Urethra palpation normal, urethra structural support normal   Urethral Meatus:  No erythema or lesions present  Cervix:     Appearance healthy, no lesions present, nontender to palpation, no bleeding present  Uterus: Second-degree prolapse     Adnexa:     No adnexal tenderness present, no adnexal masses present  Perineum:     Perineum within normal limits, no evidence of trauma, no rashes or skin lesions present  Inguinal Lymph Nodes:     No lymphadenopathy present       In-Clinic Test Results:  Results for orders placed or performed in visit on 12/04/19   US Pelvic Limited    Narrative    US Pelvic Limited   Order #: [id number] Accession #: [id number]   Study Notes      Farhana Corley on 12/4/2019  1:05 PM   Gynecological Ultrasound Report  Pelvic U/S - Transvaginal    [facility]  Referring Provider: Dr. Wilman Méndez  Sonographer: Farhana Corley [facility]  Indication: Postmenopausal bleeding  LMP (mm/dd/yyyy): Postmenopausal  History:   Gynecological Ultrasonography:   Uterus: anteverted  Size: 5.54 x 4.73 x 3.05cm.    Findings: Normal   Endometrium: Thickness total 4.06mm  Findings: Possible polyp in uterine cavity= 6x 4mm  Right Ovary: 2.41 x 1.09 x 1.32cm.    Left Ovary: 1.95 x 1.32 x .89cm.   Cul de Sac/Pouch of Wong: No FF      Impression: Endometrial polyp  __Wilman Méndez_____________________________________________________________________  ___________           Discussed here           ASSESSMENT/PLAN:                                                        74-year-old with numerous medical problems who has a recurrence of postmenopausal bleeding and thickened tissue that may be a small polyp.  She also has a second-degree prolapse with cystocele and rectocele without genuine stress incontinence.  We have discussed definitive surgery and I believe that a laparoscopic approach for vaginal hysterectomy with bilateral salpingo-oophorectomy and  anterior and posterior repair is mandated.  The patient understands the risk and complications as well as length of hospital stay and postoperative debilitation with avoidance of lifting.  She will schedule this at her disposition.        Wilman Méndez MD  [facility]

## 2019-12-04 ENCOUNTER — ANCILLARY PROCEDURE (OUTPATIENT)
Dept: ULTRASOUND IMAGING | Facility: CLINIC | Age: 74
End: 2019-12-04
Attending: OBSTETRICS & GYNECOLOGY
Payer: COMMERCIAL

## 2019-12-04 ENCOUNTER — PREP FOR PROCEDURE (OUTPATIENT)
Dept: OBGYN | Facility: CLINIC | Age: 74
End: 2019-12-04

## 2019-12-04 ENCOUNTER — OFFICE VISIT (OUTPATIENT)
Dept: OBGYN | Facility: CLINIC | Age: 74
End: 2019-12-04
Attending: OBSTETRICS & GYNECOLOGY
Payer: COMMERCIAL

## 2019-12-04 ENCOUNTER — TELEPHONE (OUTPATIENT)
Dept: OBGYN | Facility: CLINIC | Age: 74
End: 2019-12-04

## 2019-12-04 VITALS
SYSTOLIC BLOOD PRESSURE: 132 MMHG | HEIGHT: 63 IN | WEIGHT: 167 LBS | DIASTOLIC BLOOD PRESSURE: 80 MMHG | BODY MASS INDEX: 29.59 KG/M2

## 2019-12-04 DIAGNOSIS — N81.3 UTEROVAGINAL PROLAPSE, COMPLETE: Primary | ICD-10-CM

## 2019-12-04 DIAGNOSIS — N95.0 PMB (POSTMENOPAUSAL BLEEDING): ICD-10-CM

## 2019-12-04 PROCEDURE — 99214 OFFICE O/P EST MOD 30 MIN: CPT | Performed by: OBSTETRICS & GYNECOLOGY

## 2019-12-04 PROCEDURE — 76830 TRANSVAGINAL US NON-OB: CPT | Performed by: OBSTETRICS & GYNECOLOGY

## 2019-12-04 ASSESSMENT — MIFFLIN-ST. JEOR: SCORE: 1213.7

## 2019-12-04 NOTE — TELEPHONE ENCOUNTER
Type of surgery: LAVH BSO A&P RPR  Location of surgery: [facility] OR  Date and time of surgery: 12/16/2019 7:30a  Surgeon: Honey  Pre-Op Appt Date: 12/4/2019  Post-Op Appt Date: TBD   Packet sent out: HANDED 12/4/2019  Pre-cert/Authorization completed:  TBD  Date: 12/4/2019 Dinorah martinez/Teersa Fitzgerald  Surgery Scheduler    DX N81.3  CPT 54057   [id number]

## 2019-12-12 NOTE — H&P
2019  [facility] Wilman Renteria MD   OB/Gyn   Uterovaginal prolapse, complete   Dx   Ultrasound   ; Referred by Wilman Méndez MD   Reason for Visit    Progress Notes              SUBJECTIVE:                                                   Stormy Bowman is a 74 year old female who presents to clinic today for the following health issue(s):  Patient presents with:  Ultrasound: recurrent postmenopausal bleeding. patient states she had one episode of bleeding since she was last seen here        HPI: The patient is a 74-year-old who is now had a third episode of postmenopausal bleeding.  She has had 2 previous D&Cs with benign pathology.  An ultrasound today shows a small area of thickening but no sign of any malignant tissue.  She has numerous medical issues including hypercholesterolemia diabetes hypertension and hypothyroidism.  She also has second-degree uterine prolapse and we have discussed definitive surgery in the past.        No LMP recorded. Patient is postmenopausal..      Patient is not sexually active, .  Using menopause for contraception.    reports that she has never smoked. She has never used smokeless tobacco.     STD testing offered?  Declined     Health maintenance updated:  yes     Today's PHQ-2 Score:   PHQ-2 (  [employer]) 2019   Q1: Little interest or pleasure in doing things 2   Q2: Feeling down, depressed or hopeless 2   PHQ-2 Score 4      Today's PHQ-9 Score:   PHQ-9 SCORE 2019   PHQ-9 Total Score 8      Today's HARMONY-7 Score:   HARMONY-7 SCORE 2017   Total Score 6         Problem list and histories reviewed & adjusted, as indicated.  Additional history: as documented.         Patient Active Problem List   Diagnosis     LOC (loss of consciousness) (H)     Hip joint replacement status     Benign essential hypertension     Anemia due to blood loss, acute     Chronic kidney disease     Depression             Past Surgical History:    Procedure Laterality Date     ABDOMEN SURGERY         abdominalplasty     ARTHROPLASTY REVISION HIP Right 5/1/2017     Procedure: ARTHROPLASTY REVISION HIP;  REVISION RIGHT TOTAL HIP ARTHROPLASTY ;  Surgeon: Wilman Kramer MD;  Location:  OR     CHOLECYSTECTOMY         GYN SURGERY         D&C     HYSTEROSCOPY, ABLATE ENDOMETRIUM VERSAPOINT , COMBINED N/A 4/28/2017     Procedure: COMBINED HYSTEROSCOPY, ABLATE ENDOMETRIUM VERSAPOINT;  CERVICAL DILITATION FOR COMPLETE CERVICAL STENOSIS, HYSTEROSCOPY, POLYPECTOMY, ENDOMETRIAL ABLATION WITH VERSAPOINT ;  Surgeon: Wilman Méndez MD;  Location:  [facility]     JOINT REPLACEMENT         ORTHOPEDIC SURGERY         hip replacements X2,       Social History           Tobacco Use     Smoking status: Never Smoker     Smokeless tobacco: Never Used   Substance Use Topics     Alcohol use: No       Problem (# of Occurrences) Relation (Name,Age of Onset)     Cancer (1) Paternal Grandmother     Colon Cancer (1) Mother     Heart Disease (1) Father     Hyperlipidemia (2) Mother, Father     Hypertension (2) Mother, Father                   Current Outpatient Medications   Medication Sig     allopurinol (ZYLOPRIM) 100 MG tablet Take 100 mg by mouth daily (with lunch) (at noon)     ASPIRIN PO Take 162 mg by mouth daily (with dinner) (Takes 2 x 81mg tablet = 162mg)     ATORVASTATIN CALCIUM PO Take 80 mg by mouth daily (with lunch) (at noon)     celecoxib (CELEBREX) 200 MG capsule Take 200 mg by mouth     Cholecalciferol (VITAMIN D3 PO) Take 2,000 Units by mouth daily (with dinner)      DiphenhydrAMINE HCl (BENADRYL DYE-FREE ALLERGY PO) Take 50 mg by mouth At Bedtime (Takes x 25mg tablet = 50mg dose)     Levothyroxine Sodium (SYNTHROID PO) Take 75 mcg by mouth every morning      oxyCODONE (ROXICODONE) 5 MG IR tablet Take 1-2 tablets (5-10 mg) by mouth every 3 hours as needed for moderate to severe pain     Potassium Chloride Elli CR (K-DUR PO) Take 10 mEq by mouth daily (with  "lunch) (at noon)     Probiotic Product (ALIGN PO) Take 4 mg by mouth daily (with lunch) (at noon)     Psyllium (WAL-MUCIL PO) Take 1 capsule by mouth 2 times daily (at 10:00 and 14:00)     triamterene-hydrochlorothiazide (DYAZIDE) 37.5-25 MG per capsule Take 1 capsule by mouth daily (with dinner)      ZOLPIDEM TARTRATE PO Take 5-10 mg by mouth nightly as needed for sleep (patient takes 0.5 -1 X 10 mg = 5 to 10 mg dose)      No current facility-administered medications for this visit.             Allergies   Allergen Reactions     Erythromycin GI Disturbance     Penicillins Unknown     Dilaudid [Hydromorphone] Nausea and Vomiting       Can take Norco and Percocet     Pollen Extract           ROS:  12 point review of systems negative other than symptoms noted below or in the HPI.  No urinary frequency or dysuria, bladder or kidney problems        OBJECTIVE:      /80   Ht 1.588 m (5' 2.5\")   Wt 75.8 kg (167 lb)   BMI 30.06 kg/m    Body mass index is 30.06 kg/m .     Exam:  Constitutional:  Appearance: Well nourished, well developed alert, in no acute distress  Neck:  Lymph Nodes:  No lymphadenopathy present; Thyroid:  Gland size normal, nontender, no nodules or masses present on palpation  Chest:  Respiratory Effort:  Breathing unlabored. Clear to auscultation bilaterally.   Cardiovascular: Heart: Auscultation:  Regular rate, normal rhythm, no murmurs present  Gastrointestinal:  Abdominal Examination:  Abdomen nontender to palpation, tone normal without rigidity or guarding, no masses present, umbilicus without lesions; Liver/Spleen:  No hepatomegaly present, liver nontender to palpation; Hernias:  No hernias present  Lymphatic: Lymph Nodes:  No other lymphadenopathy present  Skin: General Inspection:  No rashes present, no lesions present, no areas of discoloration.  Neurologic:  Mental Status:  Oriented X3.  Normal strength and tone, sensory exam grossly normal, mentation intact and speech normal.  "   Psychiatric:  Mentation appears normal and affect normal/bright.  Pelvic Exam:  External Genitalia:                Normal appearance for age, no discharge present, no tenderness present, no inflammatory lesions present, color normal  Vagina: Cystocele and rectocele  , ATROPHIC  Bladder:                Nontender to palpation  Urethra:              Urethral Body:  Urethra palpation normal, urethra structural support normal              Urethral Meatus:  No erythema or lesions present  Cervix:                Appearance healthy, no lesions present, nontender to palpation, no bleeding present  Uterus: Second-degree prolapse                Adnexa:                No adnexal tenderness present, no adnexal masses present  Perineum:                Perineum within normal limits, no evidence of trauma, no rashes or skin lesions present  Inguinal Lymph Nodes:                No lymphadenopathy present        In-Clinic Test Results:      Results for orders placed or performed in visit on 12/04/19   US Pelvic Limited     Narrative     US Pelvic Limited   Order #: [id number] Accession #: [id number]   Study Notes      Farhana Corley on 12/4/2019  1:05 PM   Gynecological Ultrasound Report  Pelvic U/S - Transvaginal    [facility]  Referring Provider: Dr. Wilman Méndez  Sonographer: Farhana Corley [facility]  Indication: Postmenopausal bleeding  LMP (mm/dd/yyyy): Postmenopausal  History:   Gynecological Ultrasonography:   Uterus: anteverted  Size: 5.54 x 4.73 x 3.05cm.    Findings: Normal   Endometrium: Thickness total 4.06mm  Findings: Possible polyp in uterine cavity= 6x 4mm  Right Ovary: 2.41 x 1.09 x 1.32cm.    Left Ovary: 1.95 x 1.32 x .89cm.   Cul de Sac/Pouch of Wong: No FF      Impression: Endometrial polyp  __Wilman Méndez_____________________________________________________________________  ___________            Discussed here               ASSESSMENT/PLAN:                                                 "          74-year-old with numerous medical problems who has a recurrence of postmenopausal bleeding and thickened tissue that may be a small polyp.  She also has a second-degree prolapse with cystocele and rectocele without genuine stress incontinence.  We have discussed definitive surgery and I believe that a laparoscopic approach for vaginal hysterectomy with bilateral salpingo-oophorectomy and anterior and posterior repair is mandated.  The patient understands the risk and complications as well as length of hospital stay and postoperative debilitation with avoidance of lifting.  She will schedule this at her disposition.           Wilman Méndez MD  [facility] [place]      Instructions      After Visit Summary (Automatic SnapShot taken 12/4/2019)   Additional Documentation     Vitals:    /80    Ht 1.588 m (5' 2.5\")    Wt 75.8 kg (167 lb)    BMI 30.06 kg/m     BSA 1.83 m        More Vitals    Flowsheets:    NICU VS,    Anthropometrics,    Vitals Reassessment       Encounter Info:    Billing Info,    History,    Allergies,    Detailed Report       AVS Reports     Date/Time Report Action User   12/4/2019  1:53 PM After Visit Summary Automatically Generated Wilman Méndez MD   Encounter Information      Provider Department Encounter # [place]   12/4/2019 1:45 PM Wilman Méndez MD [facility] Ob/Gyn [id number] [facility]   Reviewed this Encounter      Medications Problems Allergies History   Wilman Méndez MD   Reviewed Family, Medical, Surgical, Tobacco   Adri Mack CMA   Reviewed Tobacco   Orders Placed      None   Medication Changes        None      Medication List    Visit Diagnoses         Uterovaginal prolapse, complete      Problem List        "

## 2019-12-16 ENCOUNTER — HOSPITAL ENCOUNTER (OUTPATIENT)
Facility: CLINIC | Age: 74
Discharge: HOME | End: 2019-12-17
Attending: OBSTETRICS & GYNECOLOGY | Admitting: OBSTETRICS & GYNECOLOGY
Payer: COMMERCIAL

## 2019-12-16 ENCOUNTER — ANESTHESIA EVENT (OUTPATIENT)
Dept: SURGERY | Facility: CLINIC | Age: 74
End: 2019-12-16
Payer: COMMERCIAL

## 2019-12-16 ENCOUNTER — ANESTHESIA (OUTPATIENT)
Dept: SURGERY | Facility: CLINIC | Age: 74
End: 2019-12-16
Payer: COMMERCIAL

## 2019-12-16 ENCOUNTER — SURGERY (OUTPATIENT)
Age: 74
End: 2019-12-16
Payer: COMMERCIAL

## 2019-12-16 DIAGNOSIS — N81.3 UTEROVAGINAL PROLAPSE, COMPLETE: ICD-10-CM

## 2019-12-16 PROBLEM — N81.4 UTERINE PROLAPSE: Status: ACTIVE | Noted: 2019-12-16

## 2019-12-16 LAB
CREAT SERPL-MCNC: 1.17 MG/DL (ref 0.52–1.04)
GFR SERPL CREATININE-BSD FRML MDRD: 45 ML/MIN/{1.73_M2}

## 2019-12-16 PROCEDURE — 25000128 H RX IP 250 OP 636: Performed by: ANESTHESIOLOGY

## 2019-12-16 PROCEDURE — 25000125 ZZHC RX 250: Performed by: OBSTETRICS & GYNECOLOGY

## 2019-12-16 PROCEDURE — 71000012 ZZH RECOVERY PHASE 1 LEVEL 1 FIRST HR: Performed by: OBSTETRICS & GYNECOLOGY

## 2019-12-16 PROCEDURE — 25000132 ZZH RX MED GY IP 250 OP 250 PS 637: Performed by: ANESTHESIOLOGY

## 2019-12-16 PROCEDURE — 40000936 ZZH STATISTIC OUTPATIENT (NON-OBS) NIGHT

## 2019-12-16 PROCEDURE — 25000128 H RX IP 250 OP 636: Performed by: OBSTETRICS & GYNECOLOGY

## 2019-12-16 PROCEDURE — 25800030 ZZH RX IP 258 OP 636: Performed by: OBSTETRICS & GYNECOLOGY

## 2019-12-16 PROCEDURE — 27210794 ZZH OR GENERAL SUPPLY STERILE: Performed by: OBSTETRICS & GYNECOLOGY

## 2019-12-16 PROCEDURE — 88305 TISSUE EXAM BY PATHOLOGIST: CPT | Performed by: OBSTETRICS & GYNECOLOGY

## 2019-12-16 PROCEDURE — 25000566 ZZH SEVOFLURANE, EA 15 MIN: Performed by: OBSTETRICS & GYNECOLOGY

## 2019-12-16 PROCEDURE — 36000063 ZZH SURGERY LEVEL 4 EA 15 ADDTL MIN: Performed by: OBSTETRICS & GYNECOLOGY

## 2019-12-16 PROCEDURE — 37000008 ZZH ANESTHESIA TECHNICAL FEE, 1ST 30 MIN: Performed by: OBSTETRICS & GYNECOLOGY

## 2019-12-16 PROCEDURE — 25000125 ZZHC RX 250: Performed by: NURSE ANESTHETIST, CERTIFIED REGISTERED

## 2019-12-16 PROCEDURE — 25000128 H RX IP 250 OP 636: Performed by: NURSE ANESTHETIST, CERTIFIED REGISTERED

## 2019-12-16 PROCEDURE — 40000935 ZZH STATISTIC OUTPATIENT (NON-OBS) EVE

## 2019-12-16 PROCEDURE — 40000934 ZZH STATISTIC OUTPATIENT (NON-OBS) DAY

## 2019-12-16 PROCEDURE — 25800025 ZZH RX 258: Performed by: OBSTETRICS & GYNECOLOGY

## 2019-12-16 PROCEDURE — 25800030 ZZH RX IP 258 OP 636: Performed by: NURSE ANESTHETIST, CERTIFIED REGISTERED

## 2019-12-16 PROCEDURE — 37000009 ZZH ANESTHESIA TECHNICAL FEE, EACH ADDTL 15 MIN: Performed by: OBSTETRICS & GYNECOLOGY

## 2019-12-16 PROCEDURE — 25000132 ZZH RX MED GY IP 250 OP 250 PS 637: Performed by: OBSTETRICS & GYNECOLOGY

## 2019-12-16 PROCEDURE — 36000093 ZZH SURGERY LEVEL 4 1ST 30 MIN: Performed by: OBSTETRICS & GYNECOLOGY

## 2019-12-16 PROCEDURE — 36415 COLL VENOUS BLD VENIPUNCTURE: CPT | Performed by: OBSTETRICS & GYNECOLOGY

## 2019-12-16 PROCEDURE — 40000170 ZZH STATISTIC PRE-PROCEDURE ASSESSMENT II: Performed by: OBSTETRICS & GYNECOLOGY

## 2019-12-16 PROCEDURE — 82565 ASSAY OF CREATININE: CPT | Performed by: OBSTETRICS & GYNECOLOGY

## 2019-12-16 PROCEDURE — 71000013 ZZH RECOVERY PHASE 1 LEVEL 1 EA ADDTL HR: Performed by: OBSTETRICS & GYNECOLOGY

## 2019-12-16 PROCEDURE — 58552 LAPARO-VAG HYST INCL T/O: CPT | Performed by: OBSTETRICS & GYNECOLOGY

## 2019-12-16 PROCEDURE — 88305 TISSUE EXAM BY PATHOLOGIST: CPT | Mod: 26 | Performed by: OBSTETRICS & GYNECOLOGY

## 2019-12-16 PROCEDURE — 57265 CMBN AP COLPRHY W/NTRCL RPR: CPT | Mod: 51 | Performed by: OBSTETRICS & GYNECOLOGY

## 2019-12-16 RX ORDER — ONDANSETRON 2 MG/ML
4 INJECTION INTRAMUSCULAR; INTRAVENOUS EVERY 30 MIN PRN
Status: DISCONTINUED | OUTPATIENT
Start: 2019-12-16 | End: 2019-12-16 | Stop reason: HOSPADM

## 2019-12-16 RX ORDER — CLINDAMYCIN PHOSPHATE 900 MG/50ML
900 INJECTION, SOLUTION INTRAVENOUS EVERY 8 HOURS
Status: DISCONTINUED | OUTPATIENT
Start: 2019-12-16 | End: 2019-12-17 | Stop reason: HOSPADM

## 2019-12-16 RX ORDER — ONDANSETRON 2 MG/ML
INJECTION INTRAMUSCULAR; INTRAVENOUS PRN
Status: DISCONTINUED | OUTPATIENT
Start: 2019-12-16 | End: 2019-12-16

## 2019-12-16 RX ORDER — PROPOFOL 10 MG/ML
INJECTION, EMULSION INTRAVENOUS PRN
Status: DISCONTINUED | OUTPATIENT
Start: 2019-12-16 | End: 2019-12-16

## 2019-12-16 RX ORDER — FENTANYL CITRATE 0.05 MG/ML
25-50 INJECTION, SOLUTION INTRAMUSCULAR; INTRAVENOUS
Status: DISCONTINUED | OUTPATIENT
Start: 2019-12-16 | End: 2019-12-16 | Stop reason: HOSPADM

## 2019-12-16 RX ORDER — FENTANYL CITRATE 50 UG/ML
INJECTION, SOLUTION INTRAMUSCULAR; INTRAVENOUS PRN
Status: DISCONTINUED | OUTPATIENT
Start: 2019-12-16 | End: 2019-12-16

## 2019-12-16 RX ORDER — ONDANSETRON 2 MG/ML
4 INJECTION INTRAMUSCULAR; INTRAVENOUS EVERY 6 HOURS PRN
Status: DISCONTINUED | OUTPATIENT
Start: 2019-12-16 | End: 2019-12-17 | Stop reason: HOSPADM

## 2019-12-16 RX ORDER — CLINDAMYCIN PHOSPHATE 900 MG/50ML
900 INJECTION, SOLUTION INTRAVENOUS SEE ADMIN INSTRUCTIONS
Status: DISCONTINUED | OUTPATIENT
Start: 2019-12-16 | End: 2019-12-16 | Stop reason: HOSPADM

## 2019-12-16 RX ORDER — SODIUM CHLORIDE, SODIUM LACTATE, POTASSIUM CHLORIDE, CALCIUM CHLORIDE 600; 310; 30; 20 MG/100ML; MG/100ML; MG/100ML; MG/100ML
INJECTION, SOLUTION INTRAVENOUS CONTINUOUS
Status: DISCONTINUED | OUTPATIENT
Start: 2019-12-16 | End: 2019-12-16 | Stop reason: HOSPADM

## 2019-12-16 RX ORDER — GENTAMICIN SULFATE 60 MG/50ML
2 INJECTION, SOLUTION INTRAVENOUS
Status: DISCONTINUED | OUTPATIENT
Start: 2019-12-16 | End: 2019-12-16 | Stop reason: HOSPADM

## 2019-12-16 RX ORDER — GENTAMICIN SULFATE 100 MG/100ML
1.7 INJECTION, SOLUTION INTRAVENOUS SEE ADMIN INSTRUCTIONS
Status: DISCONTINUED | OUTPATIENT
Start: 2019-12-16 | End: 2019-12-16 | Stop reason: HOSPADM

## 2019-12-16 RX ORDER — LIDOCAINE HYDROCHLORIDE AND EPINEPHRINE 10; 10 MG/ML; UG/ML
INJECTION, SOLUTION INFILTRATION; PERINEURAL PRN
Status: DISCONTINUED | OUTPATIENT
Start: 2019-12-16 | End: 2019-12-16 | Stop reason: HOSPADM

## 2019-12-16 RX ORDER — LIDOCAINE 40 MG/G
CREAM TOPICAL
Status: DISCONTINUED | OUTPATIENT
Start: 2019-12-16 | End: 2019-12-17 | Stop reason: HOSPADM

## 2019-12-16 RX ORDER — BUPIVACAINE HYDROCHLORIDE 2.5 MG/ML
INJECTION, SOLUTION EPIDURAL; INFILTRATION; INTRACAUDAL PRN
Status: DISCONTINUED | OUTPATIENT
Start: 2019-12-16 | End: 2019-12-16 | Stop reason: HOSPADM

## 2019-12-16 RX ORDER — POTASSIUM CHLORIDE 750 MG/1
10 TABLET, EXTENDED RELEASE ORAL DAILY
COMMUNITY

## 2019-12-16 RX ORDER — OXYCODONE AND ACETAMINOPHEN 5; 325 MG/1; MG/1
1-2 TABLET ORAL EVERY 4 HOURS PRN
Status: DISCONTINUED | OUTPATIENT
Start: 2019-12-16 | End: 2019-12-17 | Stop reason: HOSPADM

## 2019-12-16 RX ORDER — ACETAMINOPHEN 500 MG
1000 TABLET ORAL ONCE
Status: COMPLETED | OUTPATIENT
Start: 2019-12-16 | End: 2019-12-16

## 2019-12-16 RX ORDER — MEPERIDINE HYDROCHLORIDE 25 MG/ML
12.5 INJECTION INTRAMUSCULAR; INTRAVENOUS; SUBCUTANEOUS
Status: DISCONTINUED | OUTPATIENT
Start: 2019-12-16 | End: 2019-12-16 | Stop reason: HOSPADM

## 2019-12-16 RX ORDER — HYDROMORPHONE HYDROCHLORIDE 1 MG/ML
0.2 INJECTION, SOLUTION INTRAMUSCULAR; INTRAVENOUS; SUBCUTANEOUS
Status: DISCONTINUED | OUTPATIENT
Start: 2019-12-16 | End: 2019-12-17 | Stop reason: HOSPADM

## 2019-12-16 RX ORDER — PROCHLORPERAZINE MALEATE 5 MG
5 TABLET ORAL EVERY 6 HOURS PRN
Status: DISCONTINUED | OUTPATIENT
Start: 2019-12-16 | End: 2019-12-17 | Stop reason: HOSPADM

## 2019-12-16 RX ORDER — DEXAMETHASONE SODIUM PHOSPHATE 4 MG/ML
INJECTION, SOLUTION INTRA-ARTICULAR; INTRALESIONAL; INTRAMUSCULAR; INTRAVENOUS; SOFT TISSUE PRN
Status: DISCONTINUED | OUTPATIENT
Start: 2019-12-16 | End: 2019-12-16

## 2019-12-16 RX ORDER — GENTAMICIN SULFATE 60 MG/50ML
120 INJECTION, SOLUTION INTRAVENOUS EVERY 24 HOURS
Status: DISCONTINUED | OUTPATIENT
Start: 2019-12-17 | End: 2019-12-17 | Stop reason: HOSPADM

## 2019-12-16 RX ORDER — OXYCODONE AND ACETAMINOPHEN 5; 325 MG/1; MG/1
1-2 TABLET ORAL EVERY 4 HOURS PRN
Qty: 25 TABLET | Refills: 0 | Status: SHIPPED | OUTPATIENT
Start: 2019-12-16 | End: 2019-12-31

## 2019-12-16 RX ORDER — BUPIVACAINE HYDROCHLORIDE 2.5 MG/ML
INJECTION, SOLUTION EPIDURAL; INFILTRATION; INTRACAUDAL
Status: DISCONTINUED
Start: 2019-12-16 | End: 2019-12-16 | Stop reason: HOSPADM

## 2019-12-16 RX ORDER — NALOXONE HYDROCHLORIDE 0.4 MG/ML
.1-.4 INJECTION, SOLUTION INTRAMUSCULAR; INTRAVENOUS; SUBCUTANEOUS
Status: DISCONTINUED | OUTPATIENT
Start: 2019-12-16 | End: 2019-12-16 | Stop reason: HOSPADM

## 2019-12-16 RX ORDER — KETOROLAC TROMETHAMINE 30 MG/ML
15 INJECTION, SOLUTION INTRAMUSCULAR; INTRAVENOUS ONCE
Status: COMPLETED | OUTPATIENT
Start: 2019-12-16 | End: 2019-12-16

## 2019-12-16 RX ORDER — ACETAMINOPHEN 325 MG/1
650 TABLET ORAL EVERY 6 HOURS PRN
Status: DISCONTINUED | OUTPATIENT
Start: 2019-12-16 | End: 2019-12-17 | Stop reason: HOSPADM

## 2019-12-16 RX ORDER — OXYCODONE HYDROCHLORIDE 5 MG/1
5 TABLET ORAL EVERY 4 HOURS PRN
Status: DISCONTINUED | OUTPATIENT
Start: 2019-12-16 | End: 2019-12-16

## 2019-12-16 RX ORDER — SODIUM CHLORIDE, SODIUM LACTATE, POTASSIUM CHLORIDE, CALCIUM CHLORIDE 600; 310; 30; 20 MG/100ML; MG/100ML; MG/100ML; MG/100ML
INJECTION, SOLUTION INTRAVENOUS CONTINUOUS PRN
Status: DISCONTINUED | OUTPATIENT
Start: 2019-12-16 | End: 2019-12-16

## 2019-12-16 RX ORDER — HEPARIN SODIUM 1000 [USP'U]/ML
INJECTION, SOLUTION INTRAVENOUS; SUBCUTANEOUS
Status: DISCONTINUED
Start: 2019-12-16 | End: 2019-12-16 | Stop reason: HOSPADM

## 2019-12-16 RX ORDER — ONDANSETRON 4 MG/1
4 TABLET, ORALLY DISINTEGRATING ORAL EVERY 6 HOURS PRN
Status: DISCONTINUED | OUTPATIENT
Start: 2019-12-16 | End: 2019-12-17 | Stop reason: HOSPADM

## 2019-12-16 RX ORDER — ONDANSETRON 4 MG/1
4 TABLET, ORALLY DISINTEGRATING ORAL EVERY 30 MIN PRN
Status: DISCONTINUED | OUTPATIENT
Start: 2019-12-16 | End: 2019-12-16 | Stop reason: HOSPADM

## 2019-12-16 RX ORDER — ASPIRIN 81 MG/1
81 TABLET ORAL EVERY EVENING
COMMUNITY

## 2019-12-16 RX ORDER — LIDOCAINE HYDROCHLORIDE 20 MG/ML
INJECTION, SOLUTION INFILTRATION; PERINEURAL PRN
Status: DISCONTINUED | OUTPATIENT
Start: 2019-12-16 | End: 2019-12-16

## 2019-12-16 RX ORDER — GENTAMICIN SULFATE 60 MG/50ML
120 INJECTION, SOLUTION INTRAVENOUS EVERY 12 HOURS
Status: DISCONTINUED | OUTPATIENT
Start: 2019-12-16 | End: 2019-12-16

## 2019-12-16 RX ORDER — NALOXONE HYDROCHLORIDE 0.4 MG/ML
.1-.4 INJECTION, SOLUTION INTRAMUSCULAR; INTRAVENOUS; SUBCUTANEOUS
Status: DISCONTINUED | OUTPATIENT
Start: 2019-12-16 | End: 2019-12-17 | Stop reason: HOSPADM

## 2019-12-16 RX ORDER — HYDROMORPHONE HYDROCHLORIDE 1 MG/ML
.3-.5 INJECTION, SOLUTION INTRAMUSCULAR; INTRAVENOUS; SUBCUTANEOUS EVERY 10 MIN PRN
Status: DISCONTINUED | OUTPATIENT
Start: 2019-12-16 | End: 2019-12-16 | Stop reason: HOSPADM

## 2019-12-16 RX ORDER — CLINDAMYCIN PHOSPHATE 900 MG/50ML
900 INJECTION, SOLUTION INTRAVENOUS
Status: COMPLETED | OUTPATIENT
Start: 2019-12-16 | End: 2019-12-16

## 2019-12-16 RX ADMIN — PHENYLEPHRINE HYDROCHLORIDE 100 MCG: 10 INJECTION INTRAVENOUS at 08:24

## 2019-12-16 RX ADMIN — SODIUM CHLORIDE, POTASSIUM CHLORIDE, SODIUM LACTATE AND CALCIUM CHLORIDE: 600; 310; 30; 20 INJECTION, SOLUTION INTRAVENOUS at 07:28

## 2019-12-16 RX ADMIN — GENTAMICIN SULFATE 120 MG: 100 INJECTION, SOLUTION INTRAVENOUS at 07:39

## 2019-12-16 RX ADMIN — ACETAMINOPHEN 650 MG: 325 TABLET, FILM COATED ORAL at 19:12

## 2019-12-16 RX ADMIN — FENTANYL CITRATE 50 MCG: 0.05 INJECTION, SOLUTION INTRAMUSCULAR; INTRAVENOUS at 09:32

## 2019-12-16 RX ADMIN — ROCURONIUM BROMIDE 50 MG: 10 INJECTION INTRAVENOUS at 07:33

## 2019-12-16 RX ADMIN — ACETAMINOPHEN 1000 MG: 500 TABLET, FILM COATED ORAL at 07:19

## 2019-12-16 RX ADMIN — FENTANYL CITRATE 50 MCG: 50 INJECTION, SOLUTION INTRAMUSCULAR; INTRAVENOUS at 07:29

## 2019-12-16 RX ADMIN — OXYCODONE HYDROCHLORIDE AND ACETAMINOPHEN 1 TABLET: 5; 325 TABLET ORAL at 14:57

## 2019-12-16 RX ADMIN — PHENYLEPHRINE HYDROCHLORIDE 100 MCG: 10 INJECTION INTRAVENOUS at 08:12

## 2019-12-16 RX ADMIN — DEXTROSE AND SODIUM CHLORIDE: 5; 450 INJECTION, SOLUTION INTRAVENOUS at 20:37

## 2019-12-16 RX ADMIN — LIDOCAINE HYDROCHLORIDE,EPINEPHRINE BITARTRATE 10 ML: 10; .01 INJECTION, SOLUTION INFILTRATION; PERINEURAL at 08:45

## 2019-12-16 RX ADMIN — CLINDAMYCIN PHOSPHATE 900 MG: 900 INJECTION, SOLUTION INTRAVENOUS at 07:39

## 2019-12-16 RX ADMIN — PHENYLEPHRINE HYDROCHLORIDE 100 MCG: 10 INJECTION INTRAVENOUS at 07:43

## 2019-12-16 RX ADMIN — DEXAMETHASONE SODIUM PHOSPHATE 4 MG: 4 INJECTION, SOLUTION INTRA-ARTICULAR; INTRALESIONAL; INTRAMUSCULAR; INTRAVENOUS; SOFT TISSUE at 07:50

## 2019-12-16 RX ADMIN — BUPIVACAINE HYDROCHLORIDE 10 ML: 2.5 INJECTION, SOLUTION EPIDURAL; INFILTRATION; INTRACAUDAL at 08:46

## 2019-12-16 RX ADMIN — HYDROMORPHONE HYDROCHLORIDE 0.2 MG: 1 INJECTION, SOLUTION INTRAMUSCULAR; INTRAVENOUS; SUBCUTANEOUS at 11:26

## 2019-12-16 RX ADMIN — KETOROLAC TROMETHAMINE 15 MG: 30 INJECTION, SOLUTION INTRAMUSCULAR at 09:40

## 2019-12-16 RX ADMIN — CLINDAMYCIN PHOSPHATE 900 MG: 900 INJECTION, SOLUTION INTRAVENOUS at 15:52

## 2019-12-16 RX ADMIN — PROPOFOL 150 MG: 10 INJECTION, EMULSION INTRAVENOUS at 07:33

## 2019-12-16 RX ADMIN — ONDANSETRON 4 MG: 2 INJECTION INTRAMUSCULAR; INTRAVENOUS at 08:27

## 2019-12-16 RX ADMIN — DEXTROSE AND SODIUM CHLORIDE: 5; 450 INJECTION, SOLUTION INTRAVENOUS at 11:02

## 2019-12-16 RX ADMIN — PROCHLORPERAZINE EDISYLATE 5 MG: 5 INJECTION INTRAMUSCULAR; INTRAVENOUS at 11:08

## 2019-12-16 RX ADMIN — SUGAMMADEX 200 MG: 100 INJECTION, SOLUTION INTRAVENOUS at 08:46

## 2019-12-16 RX ADMIN — LIDOCAINE HYDROCHLORIDE 100 MG: 20 INJECTION, SOLUTION INFILTRATION; PERINEURAL at 07:33

## 2019-12-16 RX ADMIN — HEPARIN SODIUM 400 ML: 1000 INJECTION, SOLUTION INTRAVENOUS; SUBCUTANEOUS at 08:45

## 2019-12-16 ASSESSMENT — COPD QUESTIONNAIRES: COPD: 0

## 2019-12-16 ASSESSMENT — LIFESTYLE VARIABLES: TOBACCO_USE: 0

## 2019-12-16 NOTE — PHARMACY-AMINOGLYCOSIDE DOSING SERVICE
Pharmacy Aminoglycoside Initial Note  Date of Service 2019  Patient's  1945  74 year old, female    Weight (Adjusted):  60.3 kg    Indication: Surgical Prophylaxis post op    Current estimated CrCl = Estimated Creatinine Clearance: 39.5 mL/min (A) (based on SCr of 1.17 mg/dL (H)).    Creatinine for last 3 days  2019: 11:07 AM Creatinine 1.17 mg/dL     Nephrotoxins and other renal medications (From now, onward)    Start     Dose/Rate Route Frequency Ordered Stop    19 0700  gentamicin (GARAMYCIN) infusion 120 mg      120 mg  over 60 Minutes Intravenous EVERY 24 HOURS 19 1152          Aminoglycoside Levels - past 2 days  No results found for requested labs within last 48 hours.    Aminoglycosides IV Administrations (past 72 hours)                   gentamicin (GARAMYCIN) infusion 100 mg (mg) 120 mg Given 19 0739                    Plan:  1.  Start Gentamicin 120 mg (2 mg/kg) IV q24h.   2.  Target goals based on conventional dosing  3.  Goal peak level: 6-8 mg/L  4.  Goal trough level: <1 mg/L  5.  Pharmacy will continue to follow and check levels as appropriate in 3-4 doses      Tresa Arana RPH

## 2019-12-16 NOTE — OP NOTE
Procedure Date: 12/16/2019      REASON FOR ADMISSION:  Uterine prolapse.      PROCEDURE:  Laparoscopic-assisted vaginal hysterectomy, bilateral salpingo-oophorectomy, enterocele repair, anterior and posterior colporrhaphy, perineoplasty.      OPERATIVE FINDINGS:  The patient had complete uterine prolapse.  Her uterus was small and her ovaries were small and atrophic.  Upper abdomen exploration was negative.      OPERATIVE PROCEDURE:  After general anesthesia was induced, the patient was placed in the dorsal lithotomy position and prepped and draped in the usual fashion.  A Marcelino catheter was placed.  The cervix was grasped and delivered through the introitus.  Lidocaine was injected circumferentially.  A circumferential incision was made around the cervix with a large posterior enterocele V. A sponge was placed under the bladder base to develop the peritoneal reflection above.  A uterine manipulator was placed.      Through a subumbilical incision, the Veress needle was placed and 3 liters of CO2 were insufflated.  The laparoscope, trocar and sheath were placed without incident.  Two 5 mm lower quadrant trocars were placed through small incisions under direct vision without complication.  The infundibulopelvic ligament, round ligament, broad ligaments and uterine artery pedicles were each doubly cauterized bilaterally and cut.  The peritoneum overlying the sponge was cut.      We went below and entered the posterior cul-de-sac sharply.  A long billed speculum was placed.  The uterosacral ligaments were clamped, cut, tied and held for enterocele repair.  The base of the cardinal ligaments was clamped, cut and tied with 0 Vicryl.  The uterus, tubes and ovaries were removed and sent to the pathologist.  A modified Irene technique was used to cross the uterosacral ligaments and decompress the large enterocele.  The posterior apex was closed with 0 Vicryl sutures.      The anterior vaginal wall was injected with  lidocaine and midline incision carried all the way to the posterior urethrovesical angle.  A large cystocele was dissected free.  Lizette plication stitches of 2-0 chromic were placed.  The excess vaginal mucosa and fascia were excised.  The defect was closed all the way to the completion of the apical closure with 2-0 Vicryl interrupted sutures.      The posterior vaginal wall was injected with lidocaine.  A perineoplasty kacy was excised.  A midline incision was made through the vaginal mucosa.  The levators were brought to the midline with 2-0 chromic.  The defect was closed with 2-0 Vicryl all the way to the perineoplasty stitches.  A vaginal pack was placed.      Second-look laparoscopy showed absolutely no bleeding.  All gas was exhausted and instruments were removed.  The incisions were closed with 4-0 Vicryl and Steri-Strips.  The estimated blood loss was less than 20 mL.  The patient went to the recovery room in satisfactory condition.  We anticipate an overnight stay and discharge after bladder training.         NINOSKA VALENZUELA JR, MD             D: 2019   T: 2019   MT: WES      Name:     ROJELIO MCCRARY   MRN:      [id number]        Account:        [id number]   :      1945           Procedure Date: 2019      Document: [id number]

## 2019-12-16 NOTE — ANESTHESIA POSTPROCEDURE EVALUATION
Patient: Stormy Bowman    Procedure(s):  LAPAROSCOPIC VAGINAL HYSTERECTOMY, WITH BILATERAL  SALPINGO-OOPHORECTOMY  COLPORRHAPHY, COMBINED ANTEROPOSTERIOR    Diagnosis:Uterovaginal prolapse, complete [N81.3]  Diagnosis Additional Information: No value filed.    Anesthesia Type:  General, ETT    Note:  Anesthesia Post Evaluation    Patient location during evaluation: PACU  Patient participation: Able to fully participate in evaluation  Level of consciousness: awake and alert  Pain management: adequate  Airway patency: patent  Cardiovascular status: acceptable and hemodynamically stable  Respiratory status: acceptable  Hydration status: euvolemic  PONV: none     Anesthetic complications: None          Last vitals:  Vitals:    12/16/19 1138 12/16/19 1145 12/16/19 1608   BP:   112/61   Pulse:   72   Resp:  12 14   Temp:   35.9  C (96.6  F)   SpO2: (!) 88% 96% 93%         Electronically Signed By: Samir Simons MD  December 16, 2019  5:21 PM

## 2019-12-16 NOTE — PROVIDER NOTIFICATION
MD Notification    Notified Person: MD    Notified Person Name: Honey    Notification Date/Time: 1510 12/16/19    Notification Interaction: telephone    Purpose of Notification: pt requesting tylenol for pain control    Orders Received: 650 mg acetaminophen PO q 6 hrs PRN for mild pain

## 2019-12-16 NOTE — BRIEF OP NOTE
[facility] Brief Operative Note    Pre-operative diagnosis: Uterovaginal prolapse, complete [N81.3]   Post-operative diagnosis same   Procedure: Procedure(s):  LAPAROSCOPIC VAGINAL HYSTERECTOMY, WITH BILATERAL  SALPINGO-OOPHORECTOMY  COLPORRHAPHY, COMBINED ANTEROPOSTERIOR,ENTEROCELE REPAIR   Surgeon(s): Surgeon(s) and Role:     * Wilman Méndez MD - Primary   Estimated blood loss: 20 CC    Specimens: ID Type Source Tests Collected by Time Destination   A : Uterus, Bilateral ovaries & fallopian tubes Tissue Uterus with Bilateral Ovaries and Fallopian Tubes SURGICAL PATHOLOGY EXAM Wilman Méndez MD 12/16/2019  8:17 AM       Findings: PROLAPSE

## 2019-12-16 NOTE — OR NURSING
Incentive Inspirometer treatment begun.  PNDS Criteria met.  Dr. Simons here to assess Mrs. Bowman; order received to transfer to Observational Care for continued recovery.  Hand-off report to RN.  To OBS-17 per cart with all belongings.  Will transport with Capnography monitoring.

## 2019-12-16 NOTE — ANESTHESIA CARE TRANSFER NOTE
Patient: Stormy Bowman    Procedure(s):  LAPAROSCOPIC VAGINAL HYSTERECTOMY, WITH BILATERAL  SALPINGO-OOPHORECTOMY  COLPORRHAPHY, COMBINED ANTEROPOSTERIOR    Diagnosis: Uterovaginal prolapse, complete [N81.3]  Diagnosis Additional Information: No value filed.    Anesthesia Type:   General, ETT     Note:  Airway :Face Mask  Patient transferred to:PACU  Comments: Neuromuscular blockade reversed with sugammadex, spontaneous respirations, adequate tidal volumes, oropharynx suctioned with soft flexible catheter, extubated atraumatically, extubated with suction, airway patent after extubation.  Oxygen via facemask at 8 liters per minute to PACU. Oxygen tubing connected to wall O2 in PACU, SpO2, NiBP, and EKG monitors and alarms on and functioning, Monik Hugger warmer connected to patient gown, report on patient's clinical status given to PACU RN, RN questions answered. Handoff Report: Identifed the Patient, Identified the Reponsible Provider, Reviewed the pertinent medical history, Discussed the surgical course, Reviewed Intra-OP anesthesia mangement and issues during anesthesia, Set expectations for post-procedure period and Allowed opportunity for questions and acknowledgement of understanding      Vitals: (Last set prior to Anesthesia Care Transfer)    CRNA VITALS  12/16/2019 0827 - 12/16/2019 0901      12/16/2019             Resp Rate (set):  10                Electronically Signed By: DOLORES Mckeon CRNA  December 16, 2019  9:01 AM

## 2019-12-16 NOTE — ANESTHESIA PREPROCEDURE EVALUATION
Anesthesia Pre-Procedure Evaluation    Patient: Stormy Bowman   MRN: [id number] : 1945          Preoperative Diagnosis: Uterovaginal prolapse, complete [N81.3]    Procedure(s):  LAPAROSCOPIC VAGINAL HYSTERECTOMY, WITH BILATERAL  SALPINGO-OOPHORECTOMY  COLPORRHAPHY, COMBINED ANTEROPOSTERIOR    Past Medical History:   Diagnosis Date     Abnormal glucose      Anxiety      Arthritis     osteoarthritis     Chronic ischemic colitis (H)      Chronic rhinitis      Depression      Depression      Diabetes (H)      Gout      Hx of gout      Hyperlipemia      Hypertension      Insomnia      Low vitamin B12 level      Renal disease     renal insufficiency     Thyroid nodule      Past Surgical History:   Procedure Laterality Date     ABDOMEN SURGERY      abdominalplasty     ARTHROPLASTY REVISION HIP Right 2017    Procedure: ARTHROPLASTY REVISION HIP;  REVISION RIGHT TOTAL HIP ARTHROPLASTY ;  Surgeon: Wilman Kramer MD;  Location:  OR     CHOLECYSTECTOMY       GYN SURGERY      D&C     HYSTEROSCOPY, ABLATE ENDOMETRIUM VERSAPOINT , COMBINED N/A 2017    Procedure: COMBINED HYSTEROSCOPY, ABLATE ENDOMETRIUM VERSAPOINT;  CERVICAL DILITATION FOR COMPLETE CERVICAL STENOSIS, HYSTEROSCOPY, POLYPECTOMY, ENDOMETRIAL ABLATION WITH VERSAPOINT ;  Surgeon: Wilman Méndez MD;  Location: [facility]     JOINT REPLACEMENT       ORTHOPEDIC SURGERY      hip replacements X2,       Anesthesia Evaluation     .             ROS/MED HX    ENT/Pulmonary:      (-) tobacco use, asthma and COPD   Neurologic:      (-) CVA   Cardiovascular:     (+) Dyslipidemia, hypertension----. : . . . :. .       METS/Exercise Tolerance:  >4 METS   Hematologic:         Musculoskeletal:   (+) arthritis,  -       GI/Hepatic:         Renal/Genitourinary:     (+) chronic renal disease, type: CRI,       Endo: Comment: Abnormal uterine bleeding; post-menopausal    (+) type II DM thyroid problem hypothyroidism, Obesity, .      Psychiatric:     (+)  "psychiatric history depression      Infectious Disease:         Malignancy:         Other:                          Physical Exam  Normal systems: dental    Airway   Mallampati: II  TM distance: >3 FB  Neck ROM: full    Dental     Cardiovascular   Rhythm and rate: regular      Pulmonary             Lab Results   Component Value Date    WBC 7.8 10/07/2015    HGB 9.3 (L) 05/03/2017    HCT 38.6 10/07/2015     05/04/2017    SED 35 (H) 08/19/2005     05/03/2017    POTASSIUM 3.9 05/03/2017    CHLORIDE 107 05/03/2017    CO2 22 05/03/2017    BUN 13 05/03/2017    CR 1.02 05/04/2017     (H) 05/03/2017    MARIAN 8.3 (L) 05/03/2017    ALBUMIN 3.4 10/07/2015    PROTTOTAL 7.0 10/07/2015    ALT 17 10/07/2015    AST 14 10/07/2015    ALKPHOS 78 10/07/2015    BILITOTAL 0.9 10/07/2015    KATHERIN <10 (L) 10/07/2015    PTT 30 09/08/2011    INR 0.98 09/08/2011       Preop Vitals  BP Readings from Last 3 Encounters:   12/16/19 (!) 144/85   12/04/19 132/80   11/18/19 118/74    Pulse Readings from Last 3 Encounters:   11/18/19 76   05/01/17 82   10/08/15 75      Resp Readings from Last 3 Encounters:   12/16/19 16   05/04/17 16   04/28/17 12    SpO2 Readings from Last 3 Encounters:   12/16/19 94%   05/04/17 93%   04/28/17 90%      Temp Readings from Last 1 Encounters:   12/16/19 36.4  C (97.5  F) (Oral)    Ht Readings from Last 1 Encounters:   12/04/19 1.588 m (5' 2.5\")      Wt Readings from Last 1 Encounters:   12/16/19 73.9 kg (163 lb)    Estimated body mass index is 29.34 kg/m  as calculated from the following:    Height as of 12/4/19: 1.588 m (5' 2.5\").    Weight as of this encounter: 73.9 kg (163 lb).       Anesthesia Plan      History & Physical Review  History and physical reviewed and following examination; no interval change.    ASA Status:  2 .        Plan for General and ETT with Propofol induction. Maintenance will be Balanced.    PONV prophylaxis:  Ondansetron (or other 5HT-3) and Dexamethasone or Solumedrol   "     Postoperative Care  Postoperative pain management:  IV analgesics and Oral pain medications.      Consents  Anesthetic plan, risks, benefits and alternatives discussed with:  Patient..                 Samir Simons MD

## 2019-12-16 NOTE — PLAN OF CARE
Arrived to unit 1030. A&Ox4. VSS on 2 LPM (Ok on RA until dozes off then sats drop). Lap sites x4 CDI. Vag packing intact, no s/s bleeding. Nausea controlled w/ zofran and compazine. Pain controlled w/ dilaudid. Tolerating clears. Marcelino patent, adequate output. IS at bedside. IVF infusing. Daughter at bedside. Continue to monitor.

## 2019-12-16 NOTE — PROGRESS NOTES
Medication History Completed by Medication Scribe  Admission medication history interview status for the 12/16/2019  admission is complete. See EPIC admission navigator for prior to admission medications     Medication history sources: Patient, Surescripts, H&P and Patient's home med list  Medication history source reliability: Good  Adherence assessment: N/A Not Observed    Significant changes made to the medication list:  None      Additional medication history information:   None    Medication reconciliation completed by provider prior to medication history? No    Time spent in this activity: 35 minutes      Prior to Admission medications    Medication Sig Last Dose Taking? Auth Provider   allopurinol (ZYLOPRIM) 100 MG tablet Take 100 mg by mouth daily (with lunch) (at noon) 12/15/2019 at 1200 Yes Reported, Patient   aspirin 81 MG EC tablet Take 81 mg by mouth every evening 12/9/2019 Yes Reported, Patient   atorvastatin (LIPITOR) 80 MG tablet Take 80 mg by mouth At Bedtime  12/15/2019 at HS Yes Reported, Patient   celecoxib (CELEBREX) 200 MG capsule Take 200 mg by mouth twice a week On Tuesdays and Saturdays 12/14/2019 Yes Reported, Patient   Cholecalciferol (VITAMIN D3 PO) Take 2,000 Units by mouth daily  12/15/2019 at 1200 Yes Unknown, Entered By History   cyanocobalamin (CYANOCOBALAMIN) 1000 MCG/ML injection Inject 1,000 mcg into the muscle every 30 days  more than a week Yes Reported, Patient   levothyroxine (SYNTHROID) 75 MCG tablet Take 75 mcg by mouth every morning  12/15/2019 at 0700 Yes Reported, Patient   potassium chloride ER (K-DUR/KLOR-CON M) 10 MEQ CR tablet Take 10 mEq by mouth daily At noon 12/15/2019 at 1200 Yes Reported, Patient   Probiotic Product (ALIGN PO) Take 4 mg by mouth daily (with lunch) (at noon) 12/15/2019 at 1200 Yes Reported, Patient   Psyllium (WAL-MUCIL PO) Take 1 capsule by mouth 3 times daily  12/15/2019 at pm Yes Reported, Patient   triamterene-hydrochlorothiazide (DYAZIDE)  37.5-25 MG per capsule Take 1 capsule by mouth daily (with dinner)  12/16/2019 at 0445 Yes Reported, Patient   ZOLPIDEM TARTRATE PO Take 5-10 mg by mouth nightly as needed for sleep (patient takes 0.5 -1 X 10 mg = 5 to 10 mg dose) more than month at prn Yes Reported, Patient

## 2019-12-17 VITALS
DIASTOLIC BLOOD PRESSURE: 60 MMHG | HEART RATE: 64 BPM | TEMPERATURE: 97.9 F | BODY MASS INDEX: 29.34 KG/M2 | SYSTOLIC BLOOD PRESSURE: 107 MMHG | WEIGHT: 163 LBS | OXYGEN SATURATION: 95 % | RESPIRATION RATE: 16 BRPM

## 2019-12-17 LAB
COPATH REPORT: NORMAL
GLUCOSE BLDC GLUCOMTR-MCNC: 116 MG/DL (ref 70–99)
HGB BLD-MCNC: 11.5 G/DL (ref 11.7–15.7)

## 2019-12-17 PROCEDURE — 25000125 ZZHC RX 250: Performed by: OBSTETRICS & GYNECOLOGY

## 2019-12-17 PROCEDURE — 85018 HEMOGLOBIN: CPT | Performed by: OBSTETRICS & GYNECOLOGY

## 2019-12-17 PROCEDURE — 36415 COLL VENOUS BLD VENIPUNCTURE: CPT | Performed by: OBSTETRICS & GYNECOLOGY

## 2019-12-17 PROCEDURE — 40000934 ZZH STATISTIC OUTPATIENT (NON-OBS) DAY

## 2019-12-17 PROCEDURE — 25000128 H RX IP 250 OP 636: Performed by: OBSTETRICS & GYNECOLOGY

## 2019-12-17 PROCEDURE — 25000132 ZZH RX MED GY IP 250 OP 250 PS 637: Performed by: OBSTETRICS & GYNECOLOGY

## 2019-12-17 PROCEDURE — 82962 GLUCOSE BLOOD TEST: CPT

## 2019-12-17 RX ADMIN — ACETAMINOPHEN 650 MG: 325 TABLET, FILM COATED ORAL at 02:00

## 2019-12-17 RX ADMIN — ONDANSETRON 4 MG: 2 INJECTION INTRAMUSCULAR; INTRAVENOUS at 02:04

## 2019-12-17 RX ADMIN — ACETAMINOPHEN 650 MG: 325 TABLET, FILM COATED ORAL at 09:05

## 2019-12-17 RX ADMIN — CLINDAMYCIN PHOSPHATE 900 MG: 900 INJECTION, SOLUTION INTRAVENOUS at 01:42

## 2019-12-17 RX ADMIN — HYDROMORPHONE HYDROCHLORIDE 0.2 MG: 1 INJECTION, SOLUTION INTRAMUSCULAR; INTRAVENOUS; SUBCUTANEOUS at 02:02

## 2019-12-17 RX ADMIN — HYDROMORPHONE HYDROCHLORIDE 0.2 MG: 1 INJECTION, SOLUTION INTRAMUSCULAR; INTRAVENOUS; SUBCUTANEOUS at 05:32

## 2019-12-17 RX ADMIN — GENTAMICIN SULFATE 120 MG: 60 INJECTION, SOLUTION INTRAVENOUS at 07:08

## 2019-12-17 NOTE — PLAN OF CARE
AVSS; abdominal and right side pain controlled tylenol, IV dilaudid, warm packs and cold packs; zofran x 1 with IV dilaudid 2ndary to h/o nausea with no nausea noted after dilaudid; O2 weaned to room air; nguyen with good urine output; vaginal packing in placed; no vaginal drainage noted; lap sites c/d/i; IS to 1250; pt declining capno; on continuous O2 sats; IV D51/2NS at 100 ml/hr; down to 50 ml/hr at 0530 as pt with good po fluid intake.

## 2019-12-17 NOTE — PROGRESS NOTES
Gyn 1  Pt doing very well. Pack removed. Hgb 11.5.  discontinue nguyen and voiding trial. Home later today.

## 2019-12-17 NOTE — PLAN OF CARE
A&Ox4 with VSS on 2L NC. Capno WNL and IVF infusing. Marcelino patent with adequate output and tolerating full liquids. Lap sites CDI x4 and vaginal packing with no s/s of bleeding. PRN tylenol given for pain with relief. Denies numbness, tingling, SOB, and dizziness. IS at bedside.  Anticipated discharge tomorrow.

## 2019-12-17 NOTE — PROGRESS NOTES
Pt a&o, up indep in room, denies pain.  Pt voiding well and eating well, iv removed.  Pt discharge home, daughter in room and discharge instructions reviewed with pt and daughter who both verbalized understanding and any questions answered.  Pt discharge meds given to pt.  Pt discharge via wheelchair to daughters car.

## 2019-12-30 DIAGNOSIS — Z48.816 AFTERCARE FOLLOWING SURGERY OF THE GENITOURINARY SYSTEM: ICD-10-CM

## 2019-12-30 DIAGNOSIS — Z01.812 PRE-OPERATIVE LABORATORY EXAMINATION: ICD-10-CM

## 2019-12-30 DIAGNOSIS — N81.3 UTEROVAGINAL PROLAPSE, COMPLETE: Primary | ICD-10-CM

## 2019-12-31 ENCOUNTER — OFFICE VISIT (OUTPATIENT)
Dept: OBGYN | Facility: CLINIC | Age: 74
End: 2019-12-31
Payer: COMMERCIAL

## 2019-12-31 VITALS
HEIGHT: 64 IN | WEIGHT: 165 LBS | SYSTOLIC BLOOD PRESSURE: 124 MMHG | BODY MASS INDEX: 28.17 KG/M2 | DIASTOLIC BLOOD PRESSURE: 70 MMHG

## 2019-12-31 DIAGNOSIS — Z09 POSTOP CHECK: Primary | ICD-10-CM

## 2019-12-31 DIAGNOSIS — Z01.812 PRE-OPERATIVE LABORATORY EXAMINATION: ICD-10-CM

## 2019-12-31 DIAGNOSIS — N81.3 UTEROVAGINAL PROLAPSE, COMPLETE: ICD-10-CM

## 2019-12-31 LAB — HGB BLD-MCNC: 14.1 G/DL (ref 11.7–15.7)

## 2019-12-31 PROCEDURE — 85018 HEMOGLOBIN: CPT | Mod: GA | Performed by: OBSTETRICS & GYNECOLOGY

## 2019-12-31 PROCEDURE — 99024 POSTOP FOLLOW-UP VISIT: CPT | Performed by: OBSTETRICS & GYNECOLOGY

## 2019-12-31 PROCEDURE — 36415 COLL VENOUS BLD VENIPUNCTURE: CPT | Mod: GA | Performed by: OBSTETRICS & GYNECOLOGY

## 2019-12-31 ASSESSMENT — MIFFLIN-ST. JEOR: SCORE: 1228.44

## 2019-12-31 NOTE — PROGRESS NOTES
"    SUBJECTIVE:                                                   Stormy Bowman is a 74 year old female who presents to clinic today for the following health issue(s):  No chief complaint on file.      Additional information: ***    HPI:  ***    No LMP recorded. Patient is postmenopausal..     Patient {is/is not:198709::\"is\"} sexually active, .  Using {PLC CONTRACEPTION:424846} for contraception.    reports that she has never smoked. She has never used smokeless tobacco.  {Tobacco Cessation -- Delete if patient is a non-smoker:990393}  STD testing offered?  {PLC GC/CHLAMYDIA:628526}    Health maintenance updated:  {yes no:902821}    Today's PHQ-2 Score:   PHQ-2 (  [employer]) 2019   Q1: Little interest or pleasure in doing things 2   Q2: Feeling down, depressed or hopeless 2   PHQ-2 Score 4     Today's PHQ-9 Score:   PHQ-9 SCORE 2019   PHQ-9 Total Score 8     Today's HARMONY-7 Score:   HARMONY-7 SCORE 2017   Total Score 6       Problem list and histories reviewed & adjusted, as indicated.  Additional history: as documented.    Patient Active Problem List   Diagnosis     LOC (loss of consciousness) (H)     Hip joint replacement status     Benign essential hypertension     Anemia due to blood loss, acute     Chronic kidney disease     Depression     Uterovaginal prolapse, complete     Uterine prolapse     Past Surgical History:   Procedure Laterality Date     ABDOMEN SURGERY      abdominalplasty     ARTHROPLASTY REVISION HIP Right 2017    Procedure: ARTHROPLASTY REVISION HIP;  REVISION RIGHT TOTAL HIP ARTHROPLASTY ;  Surgeon: Wilman Kramer MD;  Location:  OR     CHOLECYSTECTOMY       COLPORRHAPHY ANTERIOR, POSTERIOR, COMBINED Bilateral 2019    Procedure: COLPORRHAPHY, COMBINED ANTEROPOSTERIOR;  Surgeon: Wilman Méndez MD;  Location:  OR     GYN SURGERY      D&C     HYSTEROSCOPY, ABLATE ENDOMETRIUM VERSAPOINT , COMBINED N/A 2017    Procedure: COMBINED HYSTEROSCOPY, " ABLATE ENDOMETRIUM VERSAPOINT;  CERVICAL DILITATION FOR COMPLETE CERVICAL STENOSIS, HYSTEROSCOPY, POLYPECTOMY, ENDOMETRIAL ABLATION WITH VERSAPOINT ;  Surgeon: Wilman Méndez MD;  Location:  [facility]     JOINT REPLACEMENT       LAPAROSCOPIC ASSISTED HYSTERECTOMY VAGINAL, BILATERAL SALPINGO-OOPHORECTOMY, COMBINED Bilateral 12/16/2019    Procedure: LAPAROSCOPIC VAGINAL HYSTERECTOMY, WITH BILATERAL  SALPINGO-OOPHORECTOMY;  Surgeon: Wilman Méndez MD;  Location:  OR     ORTHOPEDIC SURGERY      hip replacements X2,      Social History     Tobacco Use     Smoking status: Never Smoker     Smokeless tobacco: Never Used   Substance Use Topics     Alcohol use: No      Problem (# of Occurrences) Relation (Name,Age of Onset)    Cancer (1) Paternal Grandmother    Colon Cancer (1) Mother    Heart Disease (1) Father    Hyperlipidemia (2) Mother, Father    Hypertension (2) Mother, Father            Current Outpatient Medications   Medication Sig     allopurinol (ZYLOPRIM) 100 MG tablet Take 100 mg by mouth daily (with lunch) (at noon)     aspirin 81 MG EC tablet Take 81 mg by mouth every evening     atorvastatin (LIPITOR) 80 MG tablet Take 80 mg by mouth At Bedtime      celecoxib (CELEBREX) 200 MG capsule Take 200 mg by mouth twice a week On Tuesdays and Saturdays     Cholecalciferol (VITAMIN D3 PO) Take 2,000 Units by mouth daily      cyanocobalamin (CYANOCOBALAMIN) 1000 MCG/ML injection Inject 1,000 mcg into the muscle every 30 days      levothyroxine (SYNTHROID) 75 MCG tablet Take 75 mcg by mouth every morning      oxyCODONE-acetaminophen (PERCOCET) 5-325 MG tablet Take 1-2 tablets by mouth every 4 hours as needed for pain (moderate to severe)     potassium chloride ER (K-DUR/KLOR-CON M) 10 MEQ CR tablet Take 10 mEq by mouth daily At noon     Probiotic Product (ALIGN PO) Take 4 mg by mouth daily (with lunch) (at noon)     Psyllium (WAL-MUCIL PO) Take 1 capsule by mouth 3 times daily      triamterene-hydrochlorothiazide  "(DYAZIDE) 37.5-25 MG per capsule Take 1 capsule by mouth daily (with dinner)      ZOLPIDEM TARTRATE PO Take 5-10 mg by mouth nightly as needed for sleep (patient takes 0.5 -1 X 10 mg = 5 to 10 mg dose)     No current facility-administered medications for this visit.      Allergies   Allergen Reactions     Erythromycin GI Disturbance     Penicillins Unknown     Dilaudid [Hydromorphone] Nausea and Vomiting     Can take Norco and Percocet     Pollen Extract        ROS:  {[facility] ROSGYN:548626::\"12 point review of systems negative other than symptoms noted below or in the HPI.\"}  {ROS - :979522::\"No urinary frequency or dysuria, bladder or kidney problems\"}      OBJECTIVE:     There were no vitals taken for this visit.  There is no height or weight on file to calculate BMI.    Exam:  {[facility] EXAM CHOICES:523915}     In-Clinic Test Results:  No results found for this or any previous visit (from the past 24 hour(s)).    ASSESSMENT/PLAN:                                                      No diagnosis found.    There are no Patient Instructions on file for this visit.    ***    Wilman Méndez MD  [facility]  "

## 2019-12-31 NOTE — PROGRESS NOTES
The patient is seen for postop visit after a laparoscopic-assisted vaginal hysterectomy with bilateral salpingo-oophorectomy and anterior and posterior repair.  Her pathology was benign.  Her examination shows excellent healing of her incision lines with excellent support.  She has no bladder symptomatology and normal bowel movements.  She will continue to avoid any heavy lifting and will return to see us in 2 months.

## 2020-02-28 NOTE — PROGRESS NOTES
SUBJECTIVE:                                                   Stormy Bowman is a 74 year old female who presents to clinic today for the following health issue(s):  Patient presents with:  Post-op Visit: 19:  Laparoscopic-assisted vaginal hysterectomy, bilateral salpingo-oophorectomy, enterocele repair, anterior and posterior colporrhaphy, perineoplasty.       HPI: The patient is seen at this time in follow-up of extensive GYN surgery in December.  She had a hysterectomy with repairs at that time.  She denies any urinary or GI problems.  She has no stress loss.  She has had no bleeding.      No LMP recorded. Patient is postmenopausal..     Patient is not sexually active, .  Using not sexually active for contraception.    reports that she has never smoked. She has never used smokeless tobacco.    STD testing offered?  Declined    Health maintenance updated:  yes    Today's PHQ-2 Score:   PHQ-2 (  [employer]) 3/2/2020   Q1: Little interest or pleasure in doing things 0   Q2: Feeling down, depressed or hopeless 0   PHQ-2 Score 0     Today's PHQ-9 Score:   PHQ-9 SCORE 2019   PHQ-9 Total Score 8     Today's HARMONY-7 Score:   HARMONY-7 SCORE 2017   Total Score 6       Problem list and histories reviewed & adjusted, as indicated.  Additional history: as documented.    Patient Active Problem List   Diagnosis     LOC (loss of consciousness) (H)     Hip joint replacement status     Benign essential hypertension     Anemia due to blood loss, acute     Chronic kidney disease     Depression     Uterovaginal prolapse, complete     Uterine prolapse     Past Surgical History:   Procedure Laterality Date     ABDOMEN SURGERY      abdominalplasty     ARTHROPLASTY REVISION HIP Right 2017    Procedure: ARTHROPLASTY REVISION HIP;  REVISION RIGHT TOTAL HIP ARTHROPLASTY ;  Surgeon: Wilman Kramer MD;  Location: [facility]     CHOLECYSTECTOMY       COLPORRHAPHY ANTERIOR, POSTERIOR, COMBINED Bilateral  12/16/2019    Procedure: COLPORRHAPHY, COMBINED ANTEROPOSTERIOR;  Surgeon: Wilman Méndez MD;  Location:  OR     GYN SURGERY      D&C     HYSTEROSCOPY, ABLATE ENDOMETRIUM VERSAPOINT , COMBINED N/A 4/28/2017    Procedure: COMBINED HYSTEROSCOPY, ABLATE ENDOMETRIUM VERSAPOINT;  CERVICAL DILITATION FOR COMPLETE CERVICAL STENOSIS, HYSTEROSCOPY, POLYPECTOMY, ENDOMETRIAL ABLATION WITH VERSAPOINT ;  Surgeon: Wilman Méndez MD;  Location:  [facility]     JOINT REPLACEMENT       LAPAROSCOPIC ASSISTED HYSTERECTOMY VAGINAL, BILATERAL SALPINGO-OOPHORECTOMY, COMBINED Bilateral 12/16/2019    Procedure: LAPAROSCOPIC VAGINAL HYSTERECTOMY, WITH BILATERAL  SALPINGO-OOPHORECTOMY;  Surgeon: Wilman Méndez MD;  Location:  OR     ORTHOPEDIC SURGERY      hip replacements X2,      Social History     Tobacco Use     Smoking status: Never Smoker     Smokeless tobacco: Never Used   Substance Use Topics     Alcohol use: No      Problem (# of Occurrences) Relation (Name,Age of Onset)    Cancer (1) Paternal Grandmother    Colon Cancer (1) Mother    Heart Disease (1) Father    Hyperlipidemia (2) Mother, Father    Hypertension (2) Mother, Father            Current Outpatient Medications   Medication Sig     allopurinol (ZYLOPRIM) 100 MG tablet Take 100 mg by mouth daily (with lunch) (at noon)     aspirin 81 MG EC tablet Take 81 mg by mouth every evening     celecoxib (CELEBREX) 200 MG capsule Take 200 mg by mouth twice a week On Tuesdays and Saturdays     Cholecalciferol (VITAMIN D3 PO) Take 2,000 Units by mouth daily      cyanocobalamin (CYANOCOBALAMIN) 1000 MCG/ML injection Inject 1,000 mcg into the muscle every 30 days      levothyroxine (SYNTHROID) 88 MCG tablet Take 88 mcg by mouth every morning      potassium chloride ER (K-DUR/KLOR-CON M) 10 MEQ CR tablet Take 10 mEq by mouth daily At noon     Probiotic Product (ALIGN PO) Take 4 mg by mouth daily (with lunch) (at noon)     Psyllium (WAL-MUCIL PO) Take 1 capsule by mouth 3 times daily   "    rosuvastatin (CRESTOR) 20 MG tablet      triamterene-hydrochlorothiazide (DYAZIDE) 37.5-25 MG per capsule Take 1 capsule by mouth daily (with dinner)      ZOLPIDEM TARTRATE PO Take 5-10 mg by mouth nightly as needed for sleep (patient takes 0.5 -1 X 10 mg = 5 to 10 mg dose)     No current facility-administered medications for this visit.      Allergies   Allergen Reactions     Erythromycin GI Disturbance     Penicillins Unknown     Dilaudid [Hydromorphone] Nausea and Vomiting     Can take Norco and Percocet     Pollen Extract        ROS:  12 point review of systems negative other than symptoms noted below or in the HPI.  Endocrine: Loss of Hair  Psychiatric: Difficulty Sleeping  No urinary frequency or dysuria, bladder or kidney problems      OBJECTIVE:     /78   Pulse 80   Ht 1.626 m (5' 4\")   Wt 75.3 kg (166 lb)   BMI 28.49 kg/m    Body mass index is 28.49 kg/m .    Exam:  Constitutional:  Appearance: Well nourished, well developed alert, in no acute distress  Gastrointestinal:  Abdominal Examination:  Abdomen nontender to palpation, tone normal without rigidity or guarding, no masses present, umbilicus without lesions; Liver/Spleen:  No hepatomegaly present, liver nontender to palpation; Hernias:  No hernias present  Lymphatic: Lymph Nodes:  No other lymphadenopathy present  Skin: General Inspection:  No rashes present, no lesions present, no areas of discoloration.  Neurologic:  Mental Status:  Oriented X3.  Normal strength and tone, sensory exam grossly normal, mentation intact and speech normal.    Psychiatric:  Mentation appears normal and affect normal/bright.  Pelvic Exam:  External Genitalia:     Normal appearance for age, no discharge present, no tenderness present, no inflammatory lesions present, color normal  Vagina:     Normal vaginal vault without central or paravaginal defects, no discharge present, no inflammatory lesions present, no masses present  Bladder:     Nontender to " palpation  Urethra:   Urethral Body:  Urethra palpation normal, urethra structural support normal   Urethral Meatus:  No erythema or lesions present  Cervix:     Surgically absent  Uterus:     Surgically absent  Adnexa:     Surgically absent  Perineum:     Perineum within normal limits, no evidence of trauma, no rashes or skin lesions present  Anus:     Anus within normal limits, no hemorrhoids present  Inguinal Lymph Nodes:     No lymphadenopathy present     In-Clinic Test Results:      ASSESSMENT/PLAN:                                                        74-year-old patient with past history of pelvic floor dysfunction and uterine descensus with excellent examination after hysterectomy with repairs.  She has excellent support both anteriorly, apically, and posteriorly.  We have reviewed her ongoing physical activity and encourage ambulation without heavy lifting.  She has had numerous hip surgeries and has trouble with both knees but is trying to avoid any further surgery.  We will see her back in 6 months.    Wilman Méndez MD  [facility] [place]

## 2020-02-28 NOTE — PROGRESS NOTES
SUBJECTIVE:                                                   Stormy Bowman is a 74 year old female who presents to clinic today for the following health issue(s):  No chief complaint on file.      Additional information: ***    HPI:  ***    No LMP recorded. Patient is postmenopausal..      Patient is not sexually active, .  Using menopause for contraception.    reports that she has never smoked. She has never used smokeless tobacco.    STD testing offered?  {PLC GC/CHLAMYDIA:570909}    Health maintenance updated:  yes, due for an annual and tdap/flu?    Today's PHQ-2 Score:   PHQ-2 (  [employer]) 2019   Q1: Little interest or pleasure in doing things 2   Q2: Feeling down, depressed or hopeless 2   PHQ-2 Score 4     Today's PHQ-9 Score:   PHQ-9 SCORE 2019   PHQ-9 Total Score 8     Today's HARMONY-7 Score:   HARMONY-7 SCORE 2017   Total Score 6       Problem list and histories reviewed & adjusted, as indicated.  Additional history: as documented.    Patient Active Problem List   Diagnosis     LOC (loss of consciousness) (H)     Hip joint replacement status     Benign essential hypertension     Anemia due to blood loss, acute     Chronic kidney disease     Depression     Uterovaginal prolapse, complete     Uterine prolapse     Past Surgical History:   Procedure Laterality Date     ABDOMEN SURGERY      abdominalplasty     ARTHROPLASTY REVISION HIP Right 2017    Procedure: ARTHROPLASTY REVISION HIP;  REVISION RIGHT TOTAL HIP ARTHROPLASTY ;  Surgeon: Wilman Kramer MD;  Location:  OR     CHOLECYSTECTOMY       COLPORRHAPHY ANTERIOR, POSTERIOR, COMBINED Bilateral 2019    Procedure: COLPORRHAPHY, COMBINED ANTEROPOSTERIOR;  Surgeon: Wilman Méndez MD;  Location:  OR     GYN SURGERY      D&C     HYSTEROSCOPY, ABLATE ENDOMETRIUM VERSAPOINT , COMBINED N/A 2017    Procedure: COMBINED HYSTEROSCOPY, ABLATE ENDOMETRIUM VERSAPOINT;  CERVICAL DILITATION FOR COMPLETE CERVICAL  STENOSIS, HYSTEROSCOPY, POLYPECTOMY, ENDOMETRIAL ABLATION WITH VERSAPOINT ;  Surgeon: Wilman Méndez MD;  Location:  [facility]     JOINT REPLACEMENT       LAPAROSCOPIC ASSISTED HYSTERECTOMY VAGINAL, BILATERAL SALPINGO-OOPHORECTOMY, COMBINED Bilateral 12/16/2019    Procedure: LAPAROSCOPIC VAGINAL HYSTERECTOMY, WITH BILATERAL  SALPINGO-OOPHORECTOMY;  Surgeon: Wilman Méndez MD;  Location:  OR     ORTHOPEDIC SURGERY      hip replacements X2,      Social History     Tobacco Use     Smoking status: Never Smoker     Smokeless tobacco: Never Used   Substance Use Topics     Alcohol use: No      Problem (# of Occurrences) Relation (Name,Age of Onset)    Cancer (1) Paternal Grandmother    Colon Cancer (1) Mother    Heart Disease (1) Father    Hyperlipidemia (2) Mother, Father    Hypertension (2) Mother, Father            Current Outpatient Medications   Medication Sig     allopurinol (ZYLOPRIM) 100 MG tablet Take 100 mg by mouth daily (with lunch) (at noon)     aspirin 81 MG EC tablet Take 81 mg by mouth every evening     atorvastatin (LIPITOR) 80 MG tablet Take 80 mg by mouth At Bedtime      celecoxib (CELEBREX) 200 MG capsule Take 200 mg by mouth twice a week On Tuesdays and Saturdays     Cholecalciferol (VITAMIN D3 PO) Take 2,000 Units by mouth daily      cyanocobalamin (CYANOCOBALAMIN) 1000 MCG/ML injection Inject 1,000 mcg into the muscle every 30 days      levothyroxine (SYNTHROID) 75 MCG tablet Take 75 mcg by mouth every morning      potassium chloride ER (K-DUR/KLOR-CON M) 10 MEQ CR tablet Take 10 mEq by mouth daily At noon     Probiotic Product (ALIGN PO) Take 4 mg by mouth daily (with lunch) (at noon)     Psyllium (WAL-MUCIL PO) Take 1 capsule by mouth 3 times daily      triamterene-hydrochlorothiazide (DYAZIDE) 37.5-25 MG per capsule Take 1 capsule by mouth daily (with dinner)      ZOLPIDEM TARTRATE PO Take 5-10 mg by mouth nightly as needed for sleep (patient takes 0.5 -1 X 10 mg = 5 to 10 mg dose)     No  "current facility-administered medications for this visit.      Allergies   Allergen Reactions     Erythromycin GI Disturbance     Penicillins Unknown     Dilaudid [Hydromorphone] Nausea and Vomiting     Can take Norco and Percocet     Pollen Extract        ROS:  {[facility] ROSGYN:008243::\"12 point review of systems negative other than symptoms noted below or in the HPI.\"}  {ROS - :455845::\"No urinary frequency or dysuria, bladder or kidney problems\"}      OBJECTIVE:     There were no vitals taken for this visit.  There is no height or weight on file to calculate BMI.    Exam:  {[facility] EXAM CHOICES:518570}     In-Clinic Test Results:  No results found for this or any previous visit (from the past 24 hour(s)).    ASSESSMENT/PLAN:                                                      No diagnosis found.    There are no Patient Instructions on file for this visit.    ***    Wilman Méndez MD  [facility]  "

## 2020-03-02 ENCOUNTER — OFFICE VISIT (OUTPATIENT)
Dept: OBGYN | Facility: CLINIC | Age: 75
End: 2020-03-02
Payer: COMMERCIAL

## 2020-03-02 VITALS
SYSTOLIC BLOOD PRESSURE: 122 MMHG | BODY MASS INDEX: 28.34 KG/M2 | DIASTOLIC BLOOD PRESSURE: 78 MMHG | WEIGHT: 166 LBS | HEIGHT: 64 IN | HEART RATE: 80 BPM

## 2020-03-02 DIAGNOSIS — N81.3 UTEROVAGINAL PROLAPSE, COMPLETE: Primary | ICD-10-CM

## 2020-03-02 PROCEDURE — 99024 POSTOP FOLLOW-UP VISIT: CPT | Performed by: OBSTETRICS & GYNECOLOGY

## 2020-03-02 RX ORDER — ROSUVASTATIN CALCIUM 20 MG/1
TABLET, COATED ORAL
COMMUNITY
Start: 2020-01-26

## 2020-03-02 ASSESSMENT — MIFFLIN-ST. JEOR: SCORE: 1232.97

## 2022-11-01 ENCOUNTER — ANCILLARY PROCEDURE (OUTPATIENT)
Dept: ULTRASOUND IMAGING | Facility: CLINIC | Age: 77
End: 2022-11-01
Attending: NURSE PRACTITIONER
Payer: COMMERCIAL

## 2022-11-01 DIAGNOSIS — D17.71 ANGIOMYOLIPOMA OF KIDNEY: ICD-10-CM

## 2022-11-01 DIAGNOSIS — N18.4 CHRONIC KIDNEY DISEASE, STAGE IV (SEVERE) (H): ICD-10-CM

## 2022-11-01 PROCEDURE — 76770 US EXAM ABDO BACK WALL COMP: CPT

## 2023-08-29 NOTE — H&P
Visit     4/27/2017  [facility] Wilman Renteria MD   OB/Gyn    Postmenopausal bleeding   Dx    Menopausal Sx    Reason for visit    Progress Notes            SUBJECTIVE:       Stormy Bowman is a 71 year old female who presents to clinic today for the following health issue(s): Patient presents with:  Menopausal Sx: bleeding started 2 days, bright red, very little. Patient states getting surgery 5/1/17        HPI: The patient is seen at this time for evaluation of a short history of postmenopausal bleeding. She had a similar episode with negative D&C in 2012.        No LMP recorded. Patient is postmenopausal..   Patient is not sexually active, No obstetric history on file..  Using menopause for contraception.    reports that she has never smoked. She does not have any smokeless tobacco history on file.     STD testing offered? Declined     Health maintenance updated: yes     Today's PHQ-2 Score: No flowsheet data found.  Today's PHQ-9 Score:   PHQ-9 SCORE 4/27/2017   Total Score 3      Today's HARMONY-7 Score:   HARMONY-7 SCORE 4/27/2017   Total Score 6         Problem list and histories reviewed & adjusted, as indicated.  Additional history: as documented.         Patient Active Problem List   Diagnosis     LOC (loss of consciousness) (H)             Past Surgical History:   Procedure Laterality Date     CHOLECYSTECTOMY         JOINT REPLACEMENT                Social History   Substance Use Topics     Smoking status: Never Smoker     Smokeless tobacco: Not on file     Alcohol use No            Current Outpatient Prescriptions   Medication Sig     atorvastatin (LIPITOR) 80 MG tablet Take 80 mg by mouth     allopurinol (ZYLOPRIM) 100 MG tablet Take 100 mg by mouth     triamterene-hydrochlorothiazide (DYAZIDE) 37.5-25 MG per capsule Take 1 capsule by mouth     Potassium Chloride Elli CR (K-DUR PO) Take 10 mEq by mouth daily     Cholecalciferol (VITAMIN D3 PO) Take 2,000 Units by mouth daily      "ASPIRIN PO Take 162 mg by mouth daily     triamterene-hydrochlorothiazide (MAXZIDE-25) 37.5-25 MG per tablet Take 1 tablet by mouth daily.     SIMVASTATIN PO Take 80 mg by mouth.     Levothyroxine Sodium (SYNTHROID PO) Take 75 mcg by mouth.     DiphenhydrAMINE HCl (BENADRYL DYE-FREE ALLERGY PO) Take 50 mg by mouth nightly as needed.      No current facility-administered medications for this visit.            Allergies   Allergen Reactions     Erythromycin GI Disturbance     Penicillins           ROS:  12 point review of systems negative other than symptoms noted below.  Genitourinary: Spotting  Musculoskeletal: Joint Pain  Psychiatric: Anxiety and Difficulty Sleeping     OBJECTIVE:      BP (!) 140/92  Ht 5' 3.75\" (1.619 m)  Wt 175 lb (79.4 kg)  Breastfeeding? No  BMI 30.27 kg/m2  Body mass index is 30.27 kg/(m^2).     Exam:  Constitutional: Appearance: Well nourished, well developed alert, in no acute distress  Neck: Lymph Nodes: No lymphadenopathy present; Thyroid: Gland size normal, nontender, no nodules or masses present on palpation chest is clear to percussion and auscultation, cardiovascular exam within normal limits with normal S1 and S2 normal murmur.  Gastrointestinal: Abdominal Examination: Abdomen nontender to palpation, tone normal without rigidity or guarding, no masses present, umbilicus without lesions; Liver/Spleen: No hepatomegaly present, liver nontender to palpation; Hernias: No hernias present  Lymphatic: Lymph Nodes: No other lymphadenopathy present  Skin:General Inspection: No rashes present, no lesions present, no areas of discoloration; Genitalia and Groin: No rashes present, no lesions present, no areas of discoloration, no masses present.  Neurologic/Psychiatric: Mental Status: Oriented X3   Pelvic Exam:  External Genitalia:   Normal appearance for age, no discharge present, no tenderness present, no inflammatory lesions present, color normal  Vagina:   Normal vaginal vault without " no anesthesia administered "central or paravaginal defects, ATROPHIC  Bladder:   Nontender to palpation  Urethra:  Urethral Body: Urethra palpation normal, urethra structural support normal  Urethral Meatus: No erythema or lesions present  Cervix:   Appearance healthy, no lesions present, nontender to palpation, no bleeding present  Uterus:   Nontender to palpation, no masses present, position anteflexed, mobility: normal  Adnexa:   No adnexal tenderness present, no adnexal masses present  Perineum:   Perineum within normal limits, no evidence of trauma, no rashes or skin lesions present  Inguinal Lymph Nodes:   No lymphadenopathy present     In-Clinic Test Results:        ASSESSMENT/PLAN:         71-year-old patient with postmenopausal bleeding and intrauterine filling defect. This is most likely a solitary polyp. We recommend a hysteroscopy with polypectomy and we'll expedite this as she has orthopedic surgery next week.                 Wilman Méndez MD  [facility]      Instructions     OP AVS (Printed 4/27/2017)   Additional Documentation   Vitals:     BP  140/92     Ht 1.619 m (5' 3.75\")     Wt 79.4 kg (175 lb)     Breastfeeding? No     BMI 30.27 kg/m2     BSA 1.89 m2           More Vitals    Flowsheets:     Infection Screenings,     Custom Formula Data,     NICU VS,     Anthropometrics,     Lactation,     Vitals Reassessment,     Ambulatory Assessments        Encounter Info:     Billing Info,     History,     Allergies,     Detailed Report,     Reviewed This Encounter        AVS Reports   Date/Time Report Action User   4/27/2017  1:24 PM OP AVS Printed Holli Fitzgerald   4/27/2017  1:18 PM OP AVS Automatically Generated Wilman Méndez MD   Orders Placed        US Transvaginal Non OB   Medication Changes       None      Medication List   Visit Diagnoses        Postmenopausal bleeding      Problem List       "

## (undated) DEVICE — ENDO SCOPE WARMER LF TM500

## (undated) DEVICE — NDL COUNTER 20CT 31142493

## (undated) DEVICE — SU VICRYL 4-0 PS-2 18" UND J496H

## (undated) DEVICE — Device

## (undated) DEVICE — CATH TRAY FOLEY SURESTEP 16FR WDRAIN BAG STLK LATEX A300316A

## (undated) DEVICE — SU VICRYL 2-0 CT-2 27" J333H

## (undated) DEVICE — SOL WATER IRRIG 1000ML BOTTLE 2F7114

## (undated) DEVICE — PACK TVT HYSTEROSCOPY SMA15HYFSE

## (undated) DEVICE — ESU FCP OLYMPUS PK CUTTING 5MMX33CM PK-CF0533

## (undated) DEVICE — PEN MARKING SKIN W/LABELS 31145884

## (undated) DEVICE — BLADE KNIFE SURG 15 371115

## (undated) DEVICE — TUBING SUCTION 12"X1/4" N612

## (undated) DEVICE — SPONGE LAP 18X18" X8435

## (undated) DEVICE — NDL COUNTER 10CT

## (undated) DEVICE — GLOVE PROTEXIS W/NEU-THERA 8.5  2D73TE85

## (undated) DEVICE — GLOVE PROTEXIS W/NEU-THERA 7.5  2D73TE75

## (undated) DEVICE — DRAIN ROUND W/RESERV KIT JACKSON PRATT 10FR 400ML SU130-402D

## (undated) DEVICE — SU VICRYL 0 CP-1 27" J467H

## (undated) DEVICE — PREP SKIN SCRUB TRAY 4461A

## (undated) DEVICE — LINEN TOWEL PACK X5 5464

## (undated) DEVICE — SOL WATER IRRIG 1000ML BOTTLE 07139-09

## (undated) DEVICE — SUCTION IRR SYSTEM W/O TIP INTERPULSE HANDPIECE 0210-100-000

## (undated) DEVICE — GLOVE PROTEXIS POWDER FREE 7.5 ORTHOPEDIC 2D73ET75

## (undated) DEVICE — SOL WATER 3000ML BAG 7973-08

## (undated) DEVICE — SU VICRYL 0 CT-1 27" J340H

## (undated) DEVICE — PACK SET-UP STD 9102

## (undated) DEVICE — SU CHROMIC 2-0 CT-2 27" 883H

## (undated) DEVICE — MIDAS REX DISSECTING TOOL  F2/8TA23

## (undated) DEVICE — ESU GROUND PAD UNIVERSAL W/O CORD

## (undated) DEVICE — SOL RINGERS LACTATED 1000ML BAG 2B2324X

## (undated) DEVICE — TUBING HYDRO-SURG PLUS LAP IRRIGATOR SUCTION 0026870

## (undated) DEVICE — EVAC SYSTEM CLEAR FLOW SC082500

## (undated) DEVICE — GLOVE PROTEXIS W/NEU-THERA 7.0  2D73TE70

## (undated) DEVICE — ESU PENCIL W/HOLSTER E2350H

## (undated) DEVICE — SU VICRYL 0 CT-2 27" J334H

## (undated) DEVICE — SOL NACL 0.9% INJ 1000ML BAG 2B1324X

## (undated) DEVICE — NDL INSUFFLATION 13GA 120MM C2201

## (undated) DEVICE — GLOVE PROTEXIS MICRO 7.0  2D73PM70

## (undated) DEVICE — MANIFOLD NEPTUNE 4 PORT 700-20

## (undated) DEVICE — NDL 19GA 1.5"

## (undated) DEVICE — SOL NACL 0.9% IRRIG 1000ML BOTTLE 07138-09

## (undated) DEVICE — DRSG GAUZE 4X4" 3033

## (undated) DEVICE — GOWN IMPERVIOUS BREATHABLE SMART LG 89015

## (undated) DEVICE — SYR 05ML SLIP TIP W/O NDL 309647

## (undated) DEVICE — ESU ELEC BLADE 6" COATED E1450-6

## (undated) DEVICE — SU VICRYL 2-0 CP-1 27" UND J266H

## (undated) DEVICE — SU ETHIBOND 0 CTX CR  8X18" CX31D

## (undated) DEVICE — PACK TOTAL HIP W/U DRAPE SOP15HUFSC

## (undated) DEVICE — ESU ELEC VERSAPOINT SPRING TIP

## (undated) DEVICE — SUCTION CANISTER MEDIVAC LINER 3000ML W/LID 65651-530

## (undated) DEVICE — TUBING IRRIG TUR Y TYPE 96" LF 6543-01

## (undated) DEVICE — GOWN IMPERVIOUS SPECIALTY XLG/XLONG 32474

## (undated) DEVICE — SUCTION TIP YANKAUER W/O VENT K86

## (undated) DEVICE — GLOVE PROTEXIS MICRO 7.5  2D73PM75

## (undated) DEVICE — NDL 22GA 1.5"

## (undated) DEVICE — SYR 10ML SLIP TIP W/O NDL

## (undated) DEVICE — BLADE CLIPPER 4406

## (undated) DEVICE — IMM PILLOW ABDUCT HIP MED 31143061

## (undated) DEVICE — GLOVE PROTEXIS W/NEU-THERA 8.0  2D73TE80

## (undated) DEVICE — DRSG KERLIX FLUFFS X5

## (undated) DEVICE — DRSG XEROFORM 5X9" 8884431605

## (undated) DEVICE — SYR 10ML FINGER CONTROL W/O NDL 309695

## (undated) DEVICE — GLOVE PROTEXIS POWDER FREE 8.5 ORTHOPEDIC 2D73ET85

## (undated) DEVICE — DRSG ABDOMINAL 07 1/2X8" 7197D

## (undated) RX ORDER — PROPOFOL 10 MG/ML
INJECTION, EMULSION INTRAVENOUS
Status: DISPENSED
Start: 2019-12-16

## (undated) RX ORDER — OXYMETAZOLINE HYDROCHLORIDE 0.05 G/100ML
SPRAY NASAL
Status: DISPENSED
Start: 2019-12-16

## (undated) RX ORDER — GLYCOPYRROLATE 0.2 MG/ML
INJECTION, SOLUTION INTRAMUSCULAR; INTRAVENOUS
Status: DISPENSED
Start: 2019-12-16

## (undated) RX ORDER — DEXAMETHASONE SODIUM PHOSPHATE 4 MG/ML
INJECTION, SOLUTION INTRA-ARTICULAR; INTRALESIONAL; INTRAMUSCULAR; INTRAVENOUS; SOFT TISSUE
Status: DISPENSED
Start: 2019-12-16

## (undated) RX ORDER — PROPOFOL 10 MG/ML
INJECTION, EMULSION INTRAVENOUS
Status: DISPENSED
Start: 2017-05-01

## (undated) RX ORDER — LIDOCAINE HYDROCHLORIDE 20 MG/ML
INJECTION, SOLUTION EPIDURAL; INFILTRATION; INTRACAUDAL; PERINEURAL
Status: DISPENSED
Start: 2017-05-01

## (undated) RX ORDER — FENTANYL CITRATE 50 UG/ML
INJECTION, SOLUTION INTRAMUSCULAR; INTRAVENOUS
Status: DISPENSED
Start: 2017-04-28

## (undated) RX ORDER — KETOROLAC TROMETHAMINE 15 MG/ML
INJECTION, SOLUTION INTRAMUSCULAR; INTRAVENOUS
Status: DISPENSED
Start: 2019-12-16

## (undated) RX ORDER — OXYCODONE HCL 10 MG/1
TABLET, FILM COATED, EXTENDED RELEASE ORAL
Status: DISPENSED
Start: 2017-05-01

## (undated) RX ORDER — ONDANSETRON 2 MG/ML
INJECTION INTRAMUSCULAR; INTRAVENOUS
Status: DISPENSED
Start: 2019-12-16

## (undated) RX ORDER — FENTANYL CITRATE 50 UG/ML
INJECTION, SOLUTION INTRAMUSCULAR; INTRAVENOUS
Status: DISPENSED
Start: 2019-12-16

## (undated) RX ORDER — ACETAMINOPHEN 500 MG
TABLET ORAL
Status: DISPENSED
Start: 2019-12-16

## (undated) RX ORDER — LIDOCAINE HYDROCHLORIDE 20 MG/ML
INJECTION, SOLUTION EPIDURAL; INFILTRATION; INTRACAUDAL; PERINEURAL
Status: DISPENSED
Start: 2019-12-16

## (undated) RX ORDER — PROPOFOL 10 MG/ML
INJECTION, EMULSION INTRAVENOUS
Status: DISPENSED
Start: 2017-04-28

## (undated) RX ORDER — FENTANYL CITRATE 0.05 MG/ML
INJECTION, SOLUTION INTRAMUSCULAR; INTRAVENOUS
Status: DISPENSED
Start: 2019-12-16

## (undated) RX ORDER — DEXAMETHASONE SODIUM PHOSPHATE 4 MG/ML
INJECTION, SOLUTION INTRA-ARTICULAR; INTRALESIONAL; INTRAMUSCULAR; INTRAVENOUS; SOFT TISSUE
Status: DISPENSED
Start: 2017-04-28

## (undated) RX ORDER — KETOROLAC TROMETHAMINE 30 MG/ML
INJECTION, SOLUTION INTRAMUSCULAR; INTRAVENOUS
Status: DISPENSED
Start: 2019-12-16

## (undated) RX ORDER — ONDANSETRON 2 MG/ML
INJECTION INTRAMUSCULAR; INTRAVENOUS
Status: DISPENSED
Start: 2017-05-01

## (undated) RX ORDER — KETOROLAC TROMETHAMINE 15 MG/ML
INJECTION, SOLUTION INTRAMUSCULAR; INTRAVENOUS
Status: DISPENSED
Start: 2017-04-28

## (undated) RX ORDER — CLINDAMYCIN PHOSPHATE 900 MG/50ML
INJECTION, SOLUTION INTRAVENOUS
Status: DISPENSED
Start: 2017-05-01

## (undated) RX ORDER — NEOSTIGMINE METHYLSULFATE 1 MG/ML
VIAL (ML) INJECTION
Status: DISPENSED
Start: 2019-12-16

## (undated) RX ORDER — ONDANSETRON 2 MG/ML
INJECTION INTRAMUSCULAR; INTRAVENOUS
Status: DISPENSED
Start: 2017-04-28

## (undated) RX ORDER — ACETAMINOPHEN 500 MG
TABLET ORAL
Status: DISPENSED
Start: 2017-05-01

## (undated) RX ORDER — CLINDAMYCIN PHOSPHATE 900 MG/50ML
INJECTION, SOLUTION INTRAVENOUS
Status: DISPENSED
Start: 2019-12-16

## (undated) RX ORDER — LIDOCAINE HYDROCHLORIDE 10 MG/ML
INJECTION, SOLUTION EPIDURAL; INFILTRATION; INTRACAUDAL; PERINEURAL
Status: DISPENSED
Start: 2019-12-16

## (undated) RX ORDER — DEXAMETHASONE SODIUM PHOSPHATE 4 MG/ML
INJECTION, SOLUTION INTRA-ARTICULAR; INTRALESIONAL; INTRAMUSCULAR; INTRAVENOUS; SOFT TISSUE
Status: DISPENSED
Start: 2017-05-01

## (undated) RX ORDER — ESMOLOL HYDROCHLORIDE 10 MG/ML
INJECTION INTRAVENOUS
Status: DISPENSED
Start: 2019-12-16

## (undated) RX ORDER — LIDOCAINE HYDROCHLORIDE 20 MG/ML
SOLUTION OROPHARYNGEAL
Status: DISPENSED
Start: 2019-12-16

## (undated) RX ORDER — FENTANYL CITRATE 50 UG/ML
INJECTION, SOLUTION INTRAMUSCULAR; INTRAVENOUS
Status: DISPENSED
Start: 2017-05-01